# Patient Record
Sex: FEMALE | Race: OTHER | Employment: UNEMPLOYED | URBAN - METROPOLITAN AREA
[De-identification: names, ages, dates, MRNs, and addresses within clinical notes are randomized per-mention and may not be internally consistent; named-entity substitution may affect disease eponyms.]

---

## 2017-05-27 ENCOUNTER — HOSPITAL ENCOUNTER (EMERGENCY)
Facility: HOSPITAL | Age: 52
Discharge: HOME | End: 2017-05-27
Attending: EMERGENCY MEDICINE
Payer: COMMERCIAL

## 2017-05-27 ENCOUNTER — APPOINTMENT (OUTPATIENT)
Dept: GENERAL RADIOLOGY | Facility: HOSPITAL | Age: 52
End: 2017-05-27
Attending: EMERGENCY MEDICINE
Payer: COMMERCIAL

## 2017-05-27 VITALS
SYSTOLIC BLOOD PRESSURE: 116 MMHG | HEIGHT: 63 IN | HEART RATE: 83 BPM | OXYGEN SATURATION: 95 % | TEMPERATURE: 98 F | WEIGHT: 150 LBS | DIASTOLIC BLOOD PRESSURE: 84 MMHG | RESPIRATION RATE: 16 BRPM | BODY MASS INDEX: 26.58 KG/M2

## 2017-05-27 DIAGNOSIS — S52.125A CLOSED NONDISPLACED FRACTURE OF HEAD OF LEFT RADIUS, INITIAL ENCOUNTER: Primary | ICD-10-CM

## 2017-05-27 PROCEDURE — 73080 X-RAY EXAM OF ELBOW: CPT | Performed by: EMERGENCY MEDICINE

## 2017-05-27 PROCEDURE — 99283 EMERGENCY DEPT VISIT LOW MDM: CPT

## 2017-05-27 NOTE — ED INITIAL ASSESSMENT (HPI)
Pt states she fell yesterday on wet surface, fell onto her left arm. Denies hitting head or LOC. Limited ROM to LUE d/t pain. No visible deformity noted.

## 2017-05-27 NOTE — ED NOTES
Pt to ed36 from home for c/o left elbow pain s/p slipping yesterday and landing on it. CMS intact. Pt aox4 w/ full rom, speaking in complete sentences.

## 2017-05-29 NOTE — ED PROVIDER NOTES
Patient Seen in: [facility] AND [facility] Emergency Department    History   Patient presents with:  Upper Extremity Injury (musculoskeletal)    Stated Complaint: L elbow pain    HPI    44-year-old female presents for evaluation of left elbow pain.   Patient had motion. Neck supple. Cardiovascular: Normal rate.     Radial pulses 2+ bilaterally   Pulmonary/Chest: Effort normal.   Musculoskeletal:   Pain with flexion of left elbow, tender to palpation at left olecranon, no edema or ecchymosis   Neurological: She is

## 2017-06-09 ENCOUNTER — HOSPITAL ENCOUNTER (OUTPATIENT)
Dept: GENERAL RADIOLOGY | Facility: HOSPITAL | Age: 52
Discharge: HOME | End: 2017-06-09
Attending: INTERNAL MEDICINE
Payer: COMMERCIAL

## 2017-06-09 ENCOUNTER — LAB ENCOUNTER (OUTPATIENT)
Dept: LAB | Facility: HOSPITAL | Age: 52
End: 2017-06-09
Attending: Other
Payer: COMMERCIAL

## 2017-06-09 DIAGNOSIS — R53.1 WEAKNESS: ICD-10-CM

## 2017-06-09 DIAGNOSIS — M25.532 LEFT WRIST PAIN: ICD-10-CM

## 2017-06-09 DIAGNOSIS — R27.0 ATAXIA: Primary | ICD-10-CM

## 2017-06-09 PROCEDURE — 82550 ASSAY OF CK (CPK): CPT

## 2017-06-09 PROCEDURE — 73100 X-RAY EXAM OF WRIST: CPT | Performed by: INTERNAL MEDICINE

## 2017-06-09 PROCEDURE — 82607 VITAMIN B-12: CPT

## 2017-06-09 PROCEDURE — 86431 RHEUMATOID FACTOR QUANT: CPT

## 2017-06-09 PROCEDURE — 84443 ASSAY THYROID STIM HORMONE: CPT

## 2017-06-09 PROCEDURE — 85652 RBC SED RATE AUTOMATED: CPT

## 2017-06-09 PROCEDURE — 36415 COLL VENOUS BLD VENIPUNCTURE: CPT

## 2017-06-09 PROCEDURE — 86038 ANTINUCLEAR ANTIBODIES: CPT

## 2022-08-26 ENCOUNTER — APPOINTMENT (OUTPATIENT)
Dept: GENERAL RADIOLOGY | Facility: HOSPITAL | Age: 57
End: 2022-08-26
Attending: NURSE PRACTITIONER
Payer: COMMERCIAL

## 2022-08-26 ENCOUNTER — HOSPITAL ENCOUNTER (EMERGENCY)
Facility: HOSPITAL | Age: 57
Discharge: HOME | End: 2022-08-26
Payer: COMMERCIAL

## 2022-08-26 VITALS
HEART RATE: 79 BPM | SYSTOLIC BLOOD PRESSURE: 150 MMHG | RESPIRATION RATE: 18 BRPM | OXYGEN SATURATION: 98 % | DIASTOLIC BLOOD PRESSURE: 88 MMHG | TEMPERATURE: 99 F

## 2022-08-26 DIAGNOSIS — R53.83 FATIGUE, UNSPECIFIED TYPE: Primary | ICD-10-CM

## 2022-08-26 LAB
AMPHET UR QL SCN: NEGATIVE
ANION GAP SERPL CALC-SCNC: 5 MMOL/L (ref 0–18)
BARBITURATES UR QL SCN: NEGATIVE
BASOPHILS # BLD AUTO: 0.05 X10(3) UL (ref 0–0.2)
BASOPHILS NFR BLD AUTO: 0.7 %
BENZODIAZ UR QL SCN: NEGATIVE
BILIRUB UR QL: NEGATIVE
BUN BLD-MCNC: 11 MG/DL (ref 7–18)
BUN/CREAT SERPL: 16.2 (ref 10–20)
CALCIUM BLD-MCNC: 8.9 MG/DL (ref 8.5–10.1)
CANNABINOIDS UR QL SCN: NEGATIVE
CHLORIDE SERPL-SCNC: 112 MMOL/L (ref 98–112)
CLARITY UR: CLEAR
CO2 SERPL-SCNC: 26 MMOL/L (ref 21–32)
COCAINE UR QL: NEGATIVE
COLOR UR: YELLOW
CREAT BLD-MCNC: 0.68 MG/DL
CREAT UR-SCNC: 146 MG/DL
DEPRECATED RDW RBC AUTO: 45 FL (ref 35.1–46.3)
EOSINOPHIL # BLD AUTO: 0.21 X10(3) UL (ref 0–0.7)
EOSINOPHIL NFR BLD AUTO: 3 %
ERYTHROCYTE [DISTWIDTH] IN BLOOD BY AUTOMATED COUNT: 13.2 % (ref 11–15)
GFR SERPLBLD BASED ON 1.73 SQ M-ARVRAT: 103 ML/MIN/1.73M2 (ref 60–?)
GLUCOSE BLD-MCNC: 95 MG/DL (ref 70–99)
GLUCOSE UR-MCNC: NEGATIVE MG/DL
HCT VFR BLD AUTO: 41.7 %
HGB BLD-MCNC: 13.3 G/DL
IMM GRANULOCYTES # BLD AUTO: 0.02 X10(3) UL (ref 0–1)
IMM GRANULOCYTES NFR BLD: 0.3 %
KETONES UR-MCNC: NEGATIVE MG/DL
LEUKOCYTE ESTERASE UR QL STRIP.AUTO: NEGATIVE
LYMPHOCYTES # BLD AUTO: 2.76 X10(3) UL (ref 1–4)
LYMPHOCYTES NFR BLD AUTO: 38.9 %
MCH RBC QN AUTO: 29.6 PG (ref 26–34)
MCHC RBC AUTO-ENTMCNC: 31.9 G/DL (ref 31–37)
MCV RBC AUTO: 92.7 FL
MDMA UR QL SCN: NEGATIVE
METHADONE UR QL SCN: NEGATIVE
MONOCYTES # BLD AUTO: 0.48 X10(3) UL (ref 0.1–1)
MONOCYTES NFR BLD AUTO: 6.8 %
NEUTROPHILS # BLD AUTO: 3.57 X10 (3) UL (ref 1.5–7.7)
NEUTROPHILS # BLD AUTO: 3.57 X10(3) UL (ref 1.5–7.7)
NEUTROPHILS NFR BLD AUTO: 50.3 %
NITRITE UR QL STRIP.AUTO: NEGATIVE
OPIATES UR QL SCN: NEGATIVE
OSMOLALITY SERPL CALC.SUM OF ELEC: 295 MOSM/KG (ref 275–295)
OXYCODONE UR QL SCN: NEGATIVE
PCP UR QL SCN: NEGATIVE
PH UR: 5 [PH] (ref 5–8)
PLATELET # BLD AUTO: 322 10(3)UL (ref 150–450)
POTASSIUM SERPL-SCNC: 3.7 MMOL/L (ref 3.5–5.1)
PROT UR-MCNC: NEGATIVE MG/DL
RBC # BLD AUTO: 4.5 X10(6)UL
SODIUM SERPL-SCNC: 143 MMOL/L (ref 136–145)
SP GR UR STRIP: >=1.03 (ref 1–1.03)
UROBILINOGEN UR STRIP-ACNC: 0.2
WBC # BLD AUTO: 7.1 X10(3) UL (ref 4–11)

## 2022-08-26 PROCEDURE — 71045 X-RAY EXAM CHEST 1 VIEW: CPT | Performed by: NURSE PRACTITIONER

## 2022-08-26 PROCEDURE — 99284 EMERGENCY DEPT VISIT MOD MDM: CPT

## 2022-08-26 PROCEDURE — 85025 COMPLETE CBC W/AUTO DIFF WBC: CPT | Performed by: NURSE PRACTITIONER

## 2022-08-26 PROCEDURE — 80307 DRUG TEST PRSMV CHEM ANLYZR: CPT | Performed by: NURSE PRACTITIONER

## 2022-08-26 PROCEDURE — 80048 BASIC METABOLIC PNL TOTAL CA: CPT | Performed by: NURSE PRACTITIONER

## 2022-08-26 PROCEDURE — 93005 ELECTROCARDIOGRAM TRACING: CPT

## 2022-08-26 PROCEDURE — 93010 ELECTROCARDIOGRAM REPORT: CPT | Performed by: NURSE PRACTITIONER

## 2022-08-26 PROCEDURE — 81015 MICROSCOPIC EXAM OF URINE: CPT | Performed by: NURSE PRACTITIONER

## 2022-08-26 PROCEDURE — 36415 COLL VENOUS BLD VENIPUNCTURE: CPT

## 2022-08-26 PROCEDURE — 81001 URINALYSIS AUTO W/SCOPE: CPT | Performed by: NURSE PRACTITIONER

## 2022-08-27 NOTE — ED QUICK NOTES
Pt left without discharge papers or repeat discharge vital signs. No IV established to be removed at time of discharge. [id number] Charissa Schmitz for patient to discharge home by self per APRN.

## 2022-08-27 NOTE — ED INITIAL ASSESSMENT (HPI)
Patient presents with:  Fatigue: Aox4. Complaints of feeling fatigued and confused last night, then resolved. Reporting feeling lethargic today. [place] negative. Pt states 'they were working on my house and maybe im inhaling all the dust, im not sure. I also found 2 dead rats so i dont know if im being exposed to something'.

## 2024-08-29 ENCOUNTER — OFFICE VISIT (OUTPATIENT)
Dept: OBGYN CLINIC | Facility: CLINIC | Age: 59
End: 2024-08-29

## 2024-08-29 VITALS
HEART RATE: 69 BPM | DIASTOLIC BLOOD PRESSURE: 87 MMHG | WEIGHT: 184.81 LBS | BODY MASS INDEX: 33 KG/M2 | SYSTOLIC BLOOD PRESSURE: 126 MMHG

## 2024-08-29 DIAGNOSIS — Z12.31 VISIT FOR SCREENING MAMMOGRAM: ICD-10-CM

## 2024-08-29 DIAGNOSIS — Z12.4 SCREENING FOR MALIGNANT NEOPLASM OF CERVIX: ICD-10-CM

## 2024-08-29 DIAGNOSIS — Z01.419 ENCOUNTER FOR GYNECOLOGICAL EXAMINATION WITHOUT ABNORMAL FINDING: Primary | ICD-10-CM

## 2024-08-29 PROCEDURE — 99386 PREV VISIT NEW AGE 40-64: CPT | Performed by: OBSTETRICS & GYNECOLOGY

## 2024-08-29 PROCEDURE — 3079F DIAST BP 80-89 MM HG: CPT | Performed by: OBSTETRICS & GYNECOLOGY

## 2024-08-29 PROCEDURE — 3074F SYST BP LT 130 MM HG: CPT | Performed by: OBSTETRICS & GYNECOLOGY

## 2024-08-29 NOTE — PROGRESS NOTES
Tommy Onofre is a 59 year old female  Patient's last menstrual period was 2017.   Chief Complaint   Patient presents with    Gyn Exam     Annual, recently going through a divorce -- has never had mammogram. Last pap years ago. Has not seen PCP. Wishes for HRT. Has never had screening colonoscopy. Hx anxiety/ depr but thinks due to recent divorce. Declines any meds   Wishes for HRT due to feeling fatigued      OBSTETRICS HISTORY:     OB History    Para Term  AB Living   4 1 1 0 3 1   SAB IAB Ectopic Multiple Live Births   0 0 0 0 1      # Outcome Date GA Lbr Robin/2nd Weight Sex Type Anes PTL Lv   4 Term 01   7 lb 8 oz (3.402 kg) M Caesarean   SARWAT   3 AB            2 AB            1 AB                GYNE HISTORY:     Periods none due to menopause         No data to display                  MEDICAL HISTORY:     No past medical history on file.  Past Surgical History:   Procedure Laterality Date           OB History    Para Term  AB Living   4 1 1 0 3 1   SAB IAB Ectopic Multiple Live Births   0 0 0 0 1        SOCIAL HISTORY:     Social History     Socioeconomic History    Marital status:      Spouse name: Not on file    Number of children: Not on file    Years of education: Not on file    Highest education level: Not on file   Occupational History    Not on file   Tobacco Use    Smoking status: Never    Smokeless tobacco: Never   Substance and Sexual Activity    Alcohol use: Yes     Comment: hardly    Drug use: No    Sexual activity: Yes     Partners: Male   Other Topics Concern     Service Not Asked    Blood Transfusions Not Asked    Caffeine Concern Yes     Comment: coffee, tea, 2 cups daily    Occupational Exposure Not Asked    Hobby Hazards Not Asked    Sleep Concern Not Asked    Stress Concern Not Asked    Weight Concern Not Asked    Special Diet Not Asked    Back Care Not Asked    Exercise Not Asked    Bike Helmet Not Asked    Seat  Belt Not Asked    Self-Exams Not Asked   Social History Narrative    Not on file     Social Determinants of Health     Financial Resource Strain: Not on file   Food Insecurity: Not on file   Transportation Needs: Not on file   Physical Activity: Not on file   Stress: Not on file   Social Connections: Not on file   Housing Stability: Not on file       FAMILY HISTORY:     Family History   Problem Relation Age of Onset    Pancreatic Cancer Father     Cancer Father     Cancer Mother         nonhodgkins lymphoma    Melanoma Other         malignant       MEDICATIONS:     No current outpatient medications on file.    ALLERGIES:     No Known Allergies      REVIEW OF SYSTEMS:     Constitutional:    denies fever / chills  Eyes:     denies blurred or double vision  Cardiovascular:  denies chest pain or palpitations  Respiratory:    denies shortness of breath  Gastrointestinal:  denies severe abdominal pain, frequent diarrhea or constipation, nausea / vomiting  Genitourinary:    denies dysuria, bothersome incontinence  Skin/Breast:   denies any breast pain, lumps, or discharge  Neurological:    denies frequent severe headaches  Psychiatric:   denies depression or anxiety, thoughts of harming self or others  Heme/Lymph:    denies easy bruising or bleeding    PHYSICAL EXAM:   Blood pressure 126/87, pulse 69, weight 184 lb 12.8 oz (83.8 kg), last menstrual period 05/07/2017, not currently breastfeeding.  Constitutional:  well developed, well nourished  Head/Face:  normocephalic  Neck/Thyroid:  thyroid symmetric, no thyromegaly, no nodules, no adenopathy  Lymphatic: no abnormal supraclavicular or axillary adenopathy is noted  Breast:   normal without palpable masses, tenderness, asymmetry, nipple discharge, nipple retraction or skin changes  Respiratory:   nonlabored breathing  Cardiovascular: regular rate and rhythm  Abdomen:   soft, nontender, nondistended, no masses  Skin/Hair: no unusual rashes or bruises  Extremities:  no  edema, no cyanosis  Psychiatric:   Oriented to time, place, person and situation. Appropriate mood and affect    Pelvic Exam:  External Genitalia:  normal appearance, hair distribution, and no lesions  Urethral Meatus:   normal in size, location, without lesions and prolapse  Bladder:    no fullness, masses or tenderness  Vagina:    normal appearance without lesions, no abnormal discharge  Cervix:    normal without tenderness on motion  Uterus:    normal in size, contour, position, mobility, without tenderness  Adnexa:   normal without masses or tenderness  Perineum:   normal  Anus:    no hemorroids    ASSESSMENT & PLAN:     Tommy was seen today for gyn exam.    Diagnoses and all orders for this visit:    Encounter for gynecological examination without abnormal finding    Visit for screening mammogram  -     Mammogram Screen 3D Digital Bilateral; Future      Cannot talk about HRT without having general wellness exam by PCP & labs done / mammogram results.  Behavioral health information given    SUMMARY:    Pap: Next cotest today per ASCCP guidelines.    Mammogram: ordered placed    BCM: Postmenopausal    STD screening: declines    Colon cancer screening: guidelines reviewed    Misc: Calcium needs reviewed (1500 mg diet + supplement). Weight bearing exercise encouraged. Call if any VB (if perimenopausal, reviewed abn VB patterns)     updated    Depression screen:   Depression Screening (PHQ-2/PHQ-9): Over the LAST 2 WEEKS   Little interest or pleasure in doing things (over the last two weeks)?: Several days  Little interest or pleasure in doing things: Several days    Feeling down, depressed, or hopeless (over the last two weeks)?: More than half the days  Feeling down, depressed, or hopeless: More than half the days    PHQ-2 SCORE: 3  PHQ-2 SCORE: 3   1. Little interest or pleasure in doing things: Several days  2. Feeling down, depressed, or hopeless: More than half the days  3. Trouble falling or staying  asleep, or sleeping too much: Nearly every day  4. Feeling tired or having little energy: Nearly every day  5. Poor appetite or overeating: Not at all  6. Feeling bad about yourself - or that you are a failure or have let yourself or your family down: Nearly every day  7. Trouble concentrating on things, such as reading the newspaper or watching television: Nearly every day  8. Moving or speaking so slowly that other people could have noticed. Or the opposite - being so fidgety or restless that you have been moving around a lot more than usual: Not at all  9. Thoughts that you would be better off dead, or of hurting yourself in some way: Not at all  PHQ-9 TOTAL SCORE: 15  If you checked off any problems, how difficult have these problems made it for you to do your work, take care of things at home, or get along with other people?: Somewhat difficult        FOLLOW-UP     Return in about 1 year (around 8/29/2025) for annual gyne exam.    Note to patient and family:  The 21st Century Cures Act makes medical notes available to patients in the interest of transparency.  However, please be advised that this is a medical document.  It is intended as a peer to peer communication.  It is written in medical language and may contain abbreviations or verbiage that are technical and unfamiliar.  It may appear blunt or direct.  Medical documents are intended to carry relevant information, facts as evident, and the clinical opinion of the practitioner.

## 2024-08-30 LAB — HPV E6+E7 MRNA CVX QL NAA+PROBE: NEGATIVE

## 2024-09-05 ENCOUNTER — HOSPITAL ENCOUNTER (OUTPATIENT)
Dept: MAMMOGRAPHY | Facility: HOSPITAL | Age: 59
Discharge: HOME | End: 2024-09-05
Attending: OBSTETRICS & GYNECOLOGY
Payer: COMMERCIAL

## 2024-09-05 DIAGNOSIS — Z12.31 VISIT FOR SCREENING MAMMOGRAM: ICD-10-CM

## 2024-09-05 PROCEDURE — 77063 BREAST TOMOSYNTHESIS BI: CPT | Performed by: OBSTETRICS & GYNECOLOGY

## 2024-09-05 PROCEDURE — 77067 SCR MAMMO BI INCL CAD: CPT | Performed by: OBSTETRICS & GYNECOLOGY

## 2024-11-20 ENCOUNTER — LAB ENCOUNTER (OUTPATIENT)
Dept: LAB | Age: 59
End: 2024-11-20
Attending: INTERNAL MEDICINE
Payer: COMMERCIAL

## 2024-11-20 ENCOUNTER — OFFICE VISIT (OUTPATIENT)
Dept: INTERNAL MEDICINE CLINIC | Facility: CLINIC | Age: 59
End: 2024-11-20
Payer: COMMERCIAL

## 2024-11-20 VITALS
TEMPERATURE: 98 F | BODY MASS INDEX: 32.97 KG/M2 | HEIGHT: 63.5 IN | DIASTOLIC BLOOD PRESSURE: 80 MMHG | OXYGEN SATURATION: 100 % | HEART RATE: 68 BPM | SYSTOLIC BLOOD PRESSURE: 150 MMHG | WEIGHT: 188.38 LBS

## 2024-11-20 DIAGNOSIS — Z12.11 SCREEN FOR COLON CANCER: ICD-10-CM

## 2024-11-20 DIAGNOSIS — E66.09 CLASS 1 OBESITY DUE TO EXCESS CALORIES WITH SERIOUS COMORBIDITY AND BODY MASS INDEX (BMI) OF 32.0 TO 32.9 IN ADULT: ICD-10-CM

## 2024-11-20 DIAGNOSIS — E66.811 CLASS 1 OBESITY DUE TO EXCESS CALORIES WITH SERIOUS COMORBIDITY AND BODY MASS INDEX (BMI) OF 32.0 TO 32.9 IN ADULT: ICD-10-CM

## 2024-11-20 DIAGNOSIS — F33.0 MDD (MAJOR DEPRESSIVE DISORDER), RECURRENT EPISODE, MILD (HCC): ICD-10-CM

## 2024-11-20 DIAGNOSIS — Z00.00 LABORATORY EXAM ORDERED AS PART OF ROUTINE GENERAL MEDICAL EXAMINATION: ICD-10-CM

## 2024-11-20 DIAGNOSIS — R03.0 ELEVATED BLOOD PRESSURE READING: ICD-10-CM

## 2024-11-20 DIAGNOSIS — H91.93 BILATERAL HEARING LOSS, UNSPECIFIED HEARING LOSS TYPE: ICD-10-CM

## 2024-11-20 DIAGNOSIS — F41.1 GAD (GENERALIZED ANXIETY DISORDER): ICD-10-CM

## 2024-11-20 DIAGNOSIS — Z00.00 ROUTINE GENERAL MEDICAL EXAMINATION AT A HEALTH CARE FACILITY: Primary | ICD-10-CM

## 2024-11-20 DIAGNOSIS — H93.13 TINNITUS OF BOTH EARS: ICD-10-CM

## 2024-11-20 LAB
ALT SERPL-CCNC: 39 U/L
ANION GAP SERPL CALC-SCNC: 9 MMOL/L (ref 0–18)
AST SERPL-CCNC: 27 U/L (ref ?–34)
BASOPHILS # BLD AUTO: 0.04 X10(3) UL (ref 0–0.2)
BASOPHILS NFR BLD AUTO: 0.6 %
BUN BLD-MCNC: 15 MG/DL (ref 9–23)
CALCIUM BLD-MCNC: 10.1 MG/DL (ref 8.7–10.4)
CHLORIDE SERPL-SCNC: 109 MMOL/L (ref 98–112)
CHOLEST SERPL-MCNC: 237 MG/DL (ref ?–200)
CO2 SERPL-SCNC: 24 MMOL/L (ref 21–32)
CREAT BLD-MCNC: 0.74 MG/DL
EGFRCR SERPLBLD CKD-EPI 2021: 94 ML/MIN/1.73M2 (ref 60–?)
EOSINOPHIL # BLD AUTO: 0.21 X10(3) UL (ref 0–0.7)
EOSINOPHIL NFR BLD AUTO: 3.1 %
ERYTHROCYTE [DISTWIDTH] IN BLOOD BY AUTOMATED COUNT: 12.6 %
FASTING PATIENT LIPID ANSWER: YES
FASTING STATUS PATIENT QL REPORTED: YES
GLUCOSE BLD-MCNC: 102 MG/DL (ref 70–99)
HCT VFR BLD AUTO: 42.4 %
HCV AB SERPL QL IA: NONREACTIVE
HDLC SERPL-MCNC: 69 MG/DL (ref 40–59)
HGB BLD-MCNC: 14.3 G/DL
IMM GRANULOCYTES # BLD AUTO: 0.01 X10(3) UL (ref 0–1)
IMM GRANULOCYTES NFR BLD: 0.1 %
LDLC SERPL CALC-MCNC: 149 MG/DL (ref ?–100)
LYMPHOCYTES # BLD AUTO: 3.22 X10(3) UL (ref 1–4)
LYMPHOCYTES NFR BLD AUTO: 47.6 %
MCH RBC QN AUTO: 30 PG (ref 26–34)
MCHC RBC AUTO-ENTMCNC: 33.7 G/DL (ref 31–37)
MCV RBC AUTO: 89.1 FL
MONOCYTES # BLD AUTO: 0.48 X10(3) UL (ref 0.1–1)
MONOCYTES NFR BLD AUTO: 7.1 %
NEUTROPHILS # BLD AUTO: 2.8 X10 (3) UL (ref 1.5–7.7)
NEUTROPHILS # BLD AUTO: 2.8 X10(3) UL (ref 1.5–7.7)
NEUTROPHILS NFR BLD AUTO: 41.5 %
NONHDLC SERPL-MCNC: 168 MG/DL (ref ?–130)
OSMOLALITY SERPL CALC.SUM OF ELEC: 295 MOSM/KG (ref 275–295)
PLATELET # BLD AUTO: 346 10(3)UL (ref 150–450)
POTASSIUM SERPL-SCNC: 4 MMOL/L (ref 3.5–5.1)
RBC # BLD AUTO: 4.76 X10(6)UL
SODIUM SERPL-SCNC: 142 MMOL/L (ref 136–145)
T4 FREE SERPL-MCNC: 1.2 NG/DL (ref 0.8–1.7)
TRIGL SERPL-MCNC: 107 MG/DL (ref 30–149)
TSI SER-ACNC: 1.98 UIU/ML (ref 0.55–4.78)
VLDLC SERPL CALC-MCNC: 20 MG/DL (ref 0–30)
WBC # BLD AUTO: 6.8 X10(3) UL (ref 4–11)

## 2024-11-20 PROCEDURE — 80048 BASIC METABOLIC PNL TOTAL CA: CPT

## 2024-11-20 PROCEDURE — 99203 OFFICE O/P NEW LOW 30 MIN: CPT | Performed by: INTERNAL MEDICINE

## 2024-11-20 PROCEDURE — 3077F SYST BP >= 140 MM HG: CPT | Performed by: INTERNAL MEDICINE

## 2024-11-20 PROCEDURE — 3008F BODY MASS INDEX DOCD: CPT | Performed by: INTERNAL MEDICINE

## 2024-11-20 PROCEDURE — 86803 HEPATITIS C AB TEST: CPT

## 2024-11-20 PROCEDURE — 36415 COLL VENOUS BLD VENIPUNCTURE: CPT

## 2024-11-20 PROCEDURE — 84439 ASSAY OF FREE THYROXINE: CPT

## 2024-11-20 PROCEDURE — 84450 TRANSFERASE (AST) (SGOT): CPT

## 2024-11-20 PROCEDURE — 80061 LIPID PANEL: CPT

## 2024-11-20 PROCEDURE — 99386 PREV VISIT NEW AGE 40-64: CPT | Performed by: INTERNAL MEDICINE

## 2024-11-20 PROCEDURE — 84460 ALANINE AMINO (ALT) (SGPT): CPT

## 2024-11-20 PROCEDURE — 84443 ASSAY THYROID STIM HORMONE: CPT

## 2024-11-20 PROCEDURE — 85025 COMPLETE CBC W/AUTO DIFF WBC: CPT

## 2024-11-20 PROCEDURE — 3079F DIAST BP 80-89 MM HG: CPT | Performed by: INTERNAL MEDICINE

## 2024-11-20 RX ORDER — MELOXICAM 15 MG/1
1 TABLET ORAL DAILY PRN
COMMUNITY
Start: 2022-05-27 | End: 2024-11-20

## 2024-11-20 RX ORDER — PROGESTERONE 100 MG/1
1 CAPSULE ORAL 2 TIMES DAILY
COMMUNITY

## 2024-11-20 RX ORDER — ESTRADIOL 0.1 MG/D
1 FILM, EXTENDED RELEASE TRANSDERMAL
COMMUNITY
Start: 2022-09-08

## 2024-11-20 NOTE — PROGRESS NOTES
Tommy Onofre is a 59 year old female.     HPI:   Patient presents for a  multiple issues and physical exam. She would like to get labs done. She is a new patient.   She had leg fracture in past after twisting her foot while walking. She had to wear a boot. GM had osteoporosis.  Mood - she feels anxious , mainly social anxiety. She completed a divorce recently. Divorce process started 2 years ago. Her son is supportive. Otherwise no other family members are near. She is trying to work on building more friendships. 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 She is not currently employed. Anxiety is interrupting her sleep. Her appetite is good but she does not eat healthy. She is eating out a lot. She is feeling hopeless, sad, tearful and fatigue. She is trying to exercise. She is walking almost 1 hour every day.   Hearing issues - notices issues when she is social. But I notice she is having issues even hearing me. This started this year. Notices it from both ears but worse on right. She experiences ringing in both ears.   Hormone replacement therapy - she was on estradiol patch for vaginal dryness and painful intercourse. She also felt her mood was better on estrogen and energy levels. She stopped estradiol 3 years ago because she did not want to remain on it long-term.     REVIEW OF SYSTEMS:   GENERAL HEALTH: feels well otherwise. No f/c  NEURO: denies any  LOC, falls. Occ headaches, LH and dizziness.   VISION: denies any blurred or double vision  RESPIRATORY: denies shortness of breath, cough, or congestion  CARDIOVASCULAR: denies chest pain, pressure or palpitations  GI: denies abdominal pain, constipation, diarrhea, n/v, BRBPR, melena  : no dysuria or hematuria. No menses.   SKIN: denies any unusual skin lesions or rashes  PSYCH: mood is as above. Denies SI/HI  EXT: denies edema     Wt Readings from Last 6 Encounters:   11/20/24 188 lb 6.4 oz (85.5 kg)   08/29/24 184 lb 12.8 oz (83.8 kg)   05/27/17 150 lb (68 kg)    16 163 lb 6.4 oz (74.1 kg)   16 159 lb 3.2 oz (72.2 kg)   11/17/15 161 lb (73 kg)       Allergies[1]    Family History   Problem Relation Age of Onset    Pancreatic Cancer Father     Cancer Father     Cancer Mother         nonhodgkins lymphoma    Melanoma Other         malignant      No past medical history on file.   Past Surgical History:   Procedure Laterality Date            Social History:    Social History     Socioeconomic History    Marital status:    Tobacco Use    Smoking status: Never    Smokeless tobacco: Never   Substance and Sexual Activity    Alcohol use: Yes     Comment: hardly    Drug use: No    Sexual activity: Yes     Partners: Male   Other Topics Concern    Caffeine Concern Yes     Comment: coffee, tea, 2 cups daily           EXAM:   /88 (BP Location: Left arm, Patient Position: Sitting, Cuff Size: adult)   Pulse 68   Temp 98 °F (36.7 °C) (Temporal)   Ht 5' 3.5\" (1.613 m)   Wt 188 lb 6.4 oz (85.5 kg)   LMP 2017   SpO2 100%   BMI 32.85 kg/m²   GENERAL: A&O well developed, well nourished,in no apparent distress  SKIN: no rashes,no suspicious lesions  HEENT: atraumatic, MMM, throat is clear, bilateral cerumen is blocking her TM  NECK: supple, no jvd, no thyromegaly  LUNGS: clear to auscultation bilateraly, no c/w/r  CARDIO: RRR without g/m/r  GI: soft non tender nondistended no hsm bs throughout  NEURO: CN 2-12 grossly intact  PSYCH: pleasant  EXTREMITIES: no cyanosis, clubbing or edema    ASSESSMENT AND PLAN:   # Elevated BP: she states she has never had HTN. She will start checking pressures at home.   # Obese, BMI 32: she is working on lifestyle changes.   # MDD and anxiety: her mood is not well controlled but she declines counseling or medications. She feels she is managing with stress is good. Wellbutrin helped a bit but caused \"terrible weight loss.\", prozac did not help. She felt much better on HRT and wants to resume it but we will have  to make sure her labs are okay, her ASCVD score is low, and her blood pressures are controlled prior to starting.   # tinnitus and hearing loss , bilateral cerumen - referred to ENT for further evaluation.   # Health Maintenance: wellness exam in 11/2024  Indication for ASA (50-58 yo): calculate after she completes lipids  Colon Cancer Screening: no Fhx. Discussed and she declines scope but willing to do FIT   Cervical Cancer Screening: neg cytology and HPV in 9/2024. Repeat in 5 years (2029)  Breast Cancer Screening: has dense breasts, cont yearly mammogram. Performed shared clinical decision-making and she declines supplemental imaging.   Bone Health/Fall Prevention ([place] Chi): educated on dietary calcium/vit D3, weight bearing exercises and fall prevention  Vaccines: counseled on flu, covid, shingrix, and tdap.   Hep C screening: Ab ordered  Medical POA: her ex- would be her primary contact. He is an internal medicine physician      The patient indicates understanding of these issues and agrees to the plan.  The patient is asked to return in 3 months for HTN and HRT.   Nory Preciado MD             [1] No Known Allergies

## 2024-11-20 NOTE — PATIENT INSTRUCTIONS
I recommend the following vaccines -  TDAP (tetanus, diptheriae, pertussis) vaccine every 10 years.     Shingrix vaccine once in a lifetime. It is a two step vaccine given 2-6 months apart.     Annual FLU every September, October.    Stay up to date with COVID vaccines.     Please bring your machine into your next office visit to ensure it is accurate. I like the [employer] brand. Please keep the receipt.   Please measure your blood pressure and pulse daily and record these values. Please measure your blood pressure once daily after you have been resting for at least 5 minutes. Both feet should be flat on the ground and you should be seated with your back supported. Please communicate your blood pressures to me in 14 days.

## 2024-11-22 ENCOUNTER — LAB ENCOUNTER (OUTPATIENT)
Dept: LAB | Age: 59
End: 2024-11-22
Attending: INTERNAL MEDICINE
Payer: COMMERCIAL

## 2024-11-22 DIAGNOSIS — Z12.11 SCREEN FOR COLON CANCER: ICD-10-CM

## 2024-11-22 LAB — HEMOCCULT STL QL: NEGATIVE

## 2024-11-22 PROCEDURE — 82274 ASSAY TEST FOR BLOOD FECAL: CPT

## 2024-11-29 ENCOUNTER — HOSPITAL ENCOUNTER (OUTPATIENT)
Age: 59
Discharge: HOME | End: 2024-11-29
Payer: COMMERCIAL

## 2024-11-29 ENCOUNTER — APPOINTMENT (OUTPATIENT)
Dept: GENERAL RADIOLOGY | Age: 59
End: 2024-11-29
Attending: PHYSICIAN ASSISTANT
Payer: COMMERCIAL

## 2024-11-29 VITALS
WEIGHT: 182 LBS | HEIGHT: 63.5 IN | OXYGEN SATURATION: 98 % | TEMPERATURE: 98 F | DIASTOLIC BLOOD PRESSURE: 85 MMHG | RESPIRATION RATE: 20 BRPM | SYSTOLIC BLOOD PRESSURE: 142 MMHG | BODY MASS INDEX: 31.85 KG/M2 | HEART RATE: 86 BPM

## 2024-11-29 DIAGNOSIS — S62.629A CLOSED AVULSION FRACTURE OF MIDDLE PHALANX OF FINGER, INITIAL ENCOUNTER: Primary | ICD-10-CM

## 2024-11-29 PROCEDURE — 99213 OFFICE O/P EST LOW 20 MIN: CPT

## 2024-11-29 PROCEDURE — 73140 X-RAY EXAM OF FINGER(S): CPT | Performed by: PHYSICIAN ASSISTANT

## 2024-11-29 PROCEDURE — 26720 TREAT FINGER FRACTURE EACH: CPT

## 2024-11-29 NOTE — ED PROVIDER NOTES
Patient Seen in: [facility]      History     Chief Complaint   Patient presents with    Finger Injury     Stated Complaint: Finger Injury    Subjective:   HPI    58 YO female present to immediate care for evaluation of left third and fourth finger pain after trip and fall on uneven floor in home.  She denies hitting her head, LOC, headache, chest pain or SOB.  She reports pain and swelling to the left fourth finger, primarily at the PIP joint.  Pain to the distal left fourth finger.        Objective:     History reviewed. No pertinent past medical history.           Past Surgical History:   Procedure Laterality Date                      Social History     Socioeconomic History    Marital status:    Tobacco Use    Smoking status: Never    Smokeless tobacco: Never   Substance and Sexual Activity    Alcohol use: Not Currently    Drug use: No    Sexual activity: Yes     Partners: Male   Other Topics Concern    Caffeine Concern Yes     Comment: coffee, tea, 2 cups daily              Review of Systems    Positive for stated complaint: Finger Injury  Other systems are as noted in HPI.  Constitutional and vital signs reviewed.      All other systems reviewed and negative except as noted above.    Physical Exam     ED Triage Vitals [24 1640]   /85   Pulse 86   Resp 20   Temp 97.8 °F (36.6 °C)   Temp src Temporal   SpO2 98 %   O2 Device None (Room air)       Current Vitals:   Vital Signs  BP: 142/85  Pulse: 86  Resp: 20  Temp: 97.8 °F (36.6 °C)  Temp src: Temporal    Oxygen Therapy  SpO2: 98 %  O2 Device: None (Room air)        Physical Exam  Vitals and nursing note reviewed.   Constitutional:       General: She is not in acute distress.     Appearance: Normal appearance. She is not ill-appearing, toxic-appearing or diaphoretic.   Cardiovascular:      Rate and Rhythm: Normal rate.   Pulmonary:      Effort: Pulmonary effort is normal. No respiratory distress.    Musculoskeletal:      Left hand: Swelling (diffuse swelling to left 3rd finger) and tenderness (Tenderness to distal phalanx of left ring finger and PIP joint of left 3rd finger) present.   Skin:     Findings: Bruising (diffuse bruising to left 3rd finger and scant bruising to distal phalanx of left 4th finger) present.   Neurological:      Mental Status: She is alert and oriented to person, place, and time.   Psychiatric:         Behavior: Behavior normal.         ED Course   Labs Reviewed - No data to display  XR FINGER(S) (MIN 2 VIEWS), LEFT 3RD (CPT=73140)    Result Date: 11/29/2024  PROCEDURE:  XR FINGER(S) (MIN 2 VIEWS), LEFT 3RD (CPT=73140)  INDICATIONS:  Finger Injury  COMPARISON:  [facility], XR, XR FINGER(S) (MIN 2 VIEWS), LEFT 4TH (CPT=73140), 11/29/2024, 5:02 PM.  TECHNIQUE:  Three views of the finger were obtained.  PATIENT STATED HISTORY: (As transcribed by Technologist)  Patient states that she fell and injured her left third and fourth digits. Patient states that she has left third digit pain and swelling. Patient has limited range of motion and states that the pain is located along the left third proximal interphalangeal joint.    FINDINGS:  There is a dorsal plate avulsion fracture of the middle phalanx.  Fracture fragment 0.2 cm.  Separation 0.3 cm.  Mild underlying arthritis.             CONCLUSION:  Middle phalanx dorsal plate avulsion fracture.   LOCATION:  [facility]   Dictated by (CST): Abhishek Keenan MD on 11/29/2024 at 5:24 PM     Finalized by (CST): Abhishek Keenan MD on 11/29/2024 at 5:25 PM       XR FINGER(S) (MIN 2 VIEWS), LEFT 4TH (CPT=73140)    Result Date: 11/29/2024  PROCEDURE:  XR FINGER(S) (MIN 2 VIEWS), LEFT 4TH (CPT=73140)  INDICATIONS:  Finger Injury  COMPARISON:  [facility], XR, XR FINGER(S) (MIN 2 VIEWS), LEFT 3RD (CPT=73140), 11/29/2024, 5:01 PM.  TECHNIQUE:  Three views of the finger were obtained.  PATIENT STATED HISTORY: (As transcribed by Technologist)  Patient states that she  fell and injured her left third and fourth digits. Patient states that she has left third digit pain and swelling. Patient has limited range of motion and states that the pain is located along the left third proximal interphalangeal joint.    FINDINGS:  There is mild arthritis.  No acute fracture dislocation of the 4th digit.  The 3rd finger is partially imaged.  There is a dorsal plate avulsion fracture of the middle phalanx.  Fracture fragment 0.2 cm. Separation 0.3 cm.             CONCLUSION:  Please see as above.   LOCATION:  [facility]   Dictated by (CST): Abhishek Keenan MD on 11/29/2024 at 5:23 PM     Finalized by (CST): Abhishek Keenan MD on 11/29/2024 at 5:24 PM        I independently reviewed x-rays.  Avulsion fracture to middle phalanx.     MDM      Differential diagnosis considered but not limited to contusion, sprain, fracture    Physical exam as above. X-ray left 3rd and 4th finger  FINDINGS:  There is mild arthritis.  No acute fracture dislocation of the 4th digit.There is a dorsal plate avulsion fracture of the middle phalanx. Fracture fragment 0.2 cm. Separation 0.3 cm.     Finger splint applied to 3rd finger. Patient to continue follow-up with Hand. OTC Ibuprofen as needed for pain.       Medical Decision Making  Amount and/or Complexity of Data Reviewed  Radiology: ordered and independent interpretation performed. Decision-making details documented in ED Course.    Risk  OTC drugs.        Disposition and Plan     Clinical Impression:  1. Closed avulsion fracture of middle phalanx of finger, initial encounter         Disposition:  Discharge  11/29/2024  5:43 pm    Follow-up:  Laure Juárez PA  [address] [address]  [phone]    Schedule an appointment as soon as possible for a visit             Medications Prescribed:  Discharge Medication List as of 11/29/2024  5:43 PM              Supplementary Documentation:

## 2024-11-29 NOTE — ED INITIAL ASSESSMENT (HPI)
Presents for injury to the left middle finger. Fell forward and twisted. Is now swollen and painful cannot move completely.

## 2024-12-02 ENCOUNTER — TELEPHONE (OUTPATIENT)
Facility: CLINIC | Age: 59
End: 2024-12-02

## 2024-12-02 NOTE — TELEPHONE ENCOUNTER
Please see imaging in epic and advise if/when patient can be seen and if you would like repeat imaging in or out of splint

## 2024-12-02 NOTE — TELEPHONE ENCOUNTER
Patient was seen in  on 11/29/2024 for left middle finger fx.    Imaging in Epic.    Please advise when patient can be seen.

## 2024-12-02 NOTE — TELEPHONE ENCOUNTER
Scheduled   Future Appointments   Date Time Provider Department Center   12/4/2024 11:30 AM Travis Tadeo MD [facility]

## 2024-12-04 ENCOUNTER — HOSPITAL ENCOUNTER (OUTPATIENT)
Dept: GENERAL RADIOLOGY | Age: 59
Discharge: HOME | End: 2024-12-04
Attending: ORTHOPAEDIC SURGERY
Payer: COMMERCIAL

## 2024-12-04 ENCOUNTER — OFFICE VISIT (OUTPATIENT)
Dept: ORTHOPEDICS CLINIC | Facility: CLINIC | Age: 59
End: 2024-12-04
Payer: COMMERCIAL

## 2024-12-04 ENCOUNTER — TELEPHONE (OUTPATIENT)
Dept: ORTHOPEDICS CLINIC | Facility: CLINIC | Age: 59
End: 2024-12-04

## 2024-12-04 VITALS — WEIGHT: 182 LBS | BODY MASS INDEX: 31.85 KG/M2 | HEIGHT: 63.5 IN

## 2024-12-04 DIAGNOSIS — S62.623A CLOSED DISPLACED FRACTURE OF MIDDLE PHALANX OF LEFT MIDDLE FINGER, INITIAL ENCOUNTER: Primary | ICD-10-CM

## 2024-12-04 DIAGNOSIS — S62.623A CLOSED DISPLACED FRACTURE OF MIDDLE PHALANX OF LEFT MIDDLE FINGER, INITIAL ENCOUNTER: ICD-10-CM

## 2024-12-04 PROCEDURE — 3008F BODY MASS INDEX DOCD: CPT | Performed by: ORTHOPAEDIC SURGERY

## 2024-12-04 PROCEDURE — 73140 X-RAY EXAM OF FINGER(S): CPT | Performed by: ORTHOPAEDIC SURGERY

## 2024-12-04 PROCEDURE — 99205 OFFICE O/P NEW HI 60 MIN: CPT | Performed by: ORTHOPAEDIC SURGERY

## 2024-12-04 NOTE — H&P
Clinic Note     Assessment/Plan:  59 year old female    Left middle finger central slip avulsion fracture-there is an extensor lag is noted on examination.  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  The patient  elected to proceed with surgery.  Plan is for repair of central slip tendon to base of middle phalanx versus open reduction internal fixation of the avulsion fracture depending on size of the fragment.  Fracture Patient consented to surgery after expected outcomes, time required for recovery, need for therapy, and risks associated with surgery which include but not limited to: infection, nerve injury, CRPS, tendon injury/rupture, vascular injury, stiffness, loss of reduction, implant failure, symptomatic hardware, nonunion, persistent deformity/extensor lag.  Surgery scheduled for 12/10/2024    Follow Up: Sx date    Diagnostic Studies:     XR Left middle finger 3 views: Displaced avulsion fracture with the PIP joint in extension       Physical Exam:     Ht 5' 3.5\" (1.613 m)   Wt 182 lb (82.6 kg)   LMP 05/07/2017   BMI 31.73 kg/m²     Constitutional: NAD. AOx3. Well-developed and Well-nourished.   Psychiatric: Normal mood/ affect/ behavior. Judgment and thought content normal.     Left Upper Extremity:     Inspection    Skin intact. No skin lesions. No obvious mass visualized.  Swelling and subtle bruising around the PIP joint noted.  Finger rests in a slightly flexed position at the PIP joint   Palpation    Tenderness to palpation of the PIP joint      ROM    Extensor lag of 15 to 20 degrees is noted.  Subtle hyperextension at the DIP joint was noted on examination     Neurovascular    Normal sensation in the median, ulnar, and radial nerve distribution. Normal motor  function of muscles innervated by median/AIN, ulnar, and radial/PIN nerves.    Normally perfused hand(s).     Special    None          CC: Left middle finger injury    HPI: This 59 year old RHD female presents with injury to the left middle finger.  Occurred Friday, 2024 after a fall.  Patient reports moderate aching sharp type pain.w patient was seen initially in the immediate care where she was splinted after x-rays confirmed fracture.    Occupation: Housewife    History/Other:   No past medical history on file.  Past Surgical History:   Procedure Laterality Date           Current Outpatient Medications   Medication Sig Dispense Refill    estradiol (LYLLANA) 0.1 MG/24HR Transdermal Patch Biweekly Place 1 patch onto the skin twice a week. (Patient not taking: Reported on 2024)      progesterone 100 MG Oral Cap Take 1 capsule (100 mg total) by mouth 2 (two) times daily. (Patient not taking: Reported on 2024)       Allergies[1]  Family History   Problem Relation Age of Onset    Pancreatic Cancer Father     Cancer Mother         nonhodgkins lymphoma    Melanoma Other         malignant     Social History     Occupational History    Not on file   Tobacco Use    Smoking status: Never    Smokeless tobacco: Never   Substance and Sexual Activity    Alcohol use: Not Currently    Drug use: No    Sexual activity: Yes     Partners: Male          Review of Systems (negative unless bolded):  General: fevers, chills, fatigue  CV:  chest pain, palpitations, leg swelling  Msk: bodyaches, neck pain, neck stiffness  Skin: rashes, open wounds, nonhealing ulcers  Hem: bleeds easily, bruise easily, immunocompromised  Neuro: dizziness, light headedness, headaches  Psych: anxious, depressed, anger issues      Travis Tadeo MD   Hand, Wrist, & Elbow Surgery  [email or web address]  t: [phone]  f: [phone]         [1] No Known Allergies

## 2024-12-04 NOTE — TELEPHONE ENCOUNTER
Date of Surgery: 12/10/2024    Post Op Appt: 2024 1040AM    Case ID: [id number]    Notes:     SURGICAL BOOKING SHEET   Name: Tommy Onofre  MRN: [id number]   : 1965     Surgical Date:    12/10/24   Surgical Consent:    Repair of left middle finger central slip avulsion fracture   Diagnosis:         ICD-10-CM   1. Closed displaced fracture of middle phalanx of left middle finger, initial encounter  S62.623A       Workers Comp: No   Procedure Codes:    ORIF 96052   Disposition:    Outpatient   Operative Time:    45 mins   Antibiotics:    Ancef 2g   Anesthesia Type:    Monitored Anesthesia Care (MAC)   Clearance:     None   Equipment:    Local Premix, [employer]ati [employer] juggernaut anchor 1.0 mm with 3-0 suture x 2, 4-0 nylon, Irrisept, bacitracin, Adaptic, AlumaFoam splint , 1 inch coban,    Assistant:    Carlyle Assistant: Yes, Laure Torres PA-C   Follow Up:    7-14 days post op with Laure   Pain Medication:    Tramadol 50 mg   Therapy:    TBD

## 2024-12-04 NOTE — PROGRESS NOTES
SURGICAL BOOKING SHEET   Name: Tommy Onofre  MRN: [id number]   : 1965    Surgical Date:   12/10/24   Surgical Consent:   Repair of left middle finger central slip avulsion fracture   Diagnosis:      ICD-10-CM   1. Closed displaced fracture of middle phalanx of left middle finger, initial encounter  S62.623A       Workers Comp: No   Procedure Codes:   ORIF 72569   Disposition:   Outpatient   Operative Time:   45 mins   Antibiotics:   Ancef 2g   Anesthesia Type:   Monitored Anesthesia Care (MAC)   Clearance:    None   Equipment:   Local Premix, Tati [employer] juggernaut anchor 1.0 mm with 3-0 suture x 2, 4-0 nylon, Irrisept, bacitracin, Adaptic, AlumaFoam splint , 1 inch coban,    Assistant:   Carlyle Assistant: Yes, Laure Torres PA-C   Follow Up:   7-14 days post op with Laure   Pain Medication:   Tramadol 50 mg   Therapy:   TBD

## 2024-12-05 NOTE — TELEPHONE ENCOUNTER
S/P went over surgical protocol and scheduled Post Op Appointment. Patient had no other questions or concerns. I did advise the patient if they have any additional questions to give us a call back for further assistance.

## 2024-12-11 DIAGNOSIS — S62.623A CLOSED DISPLACED FRACTURE OF MIDDLE PHALANX OF LEFT MIDDLE FINGER, INITIAL ENCOUNTER: Primary | ICD-10-CM

## 2024-12-16 ENCOUNTER — TELEPHONE (OUTPATIENT)
Dept: PHYSICAL THERAPY | Facility: HOSPITAL | Age: 59
End: 2024-12-16

## 2024-12-16 NOTE — PROGRESS NOTES
OCCUPATIONAL THERAPY UPPER EXTREMITY EVALUATION   Referring Provider: / Franck  Diagnosis:   Closed displaced fracture of middle phalanx of left middle finger;  central slip surgery and lateral band repair/involvement     Orders:   Order Date:  12/11/2024  Authorizing Provider:   DAR MACKENZIE     Procedure:  OP REFERRAL TO [facility] [[id number]]         Order #:   [id number]  Qty:  1     Priority:  Critical                   Class:   IHP - RFL     Standing Interval:          Standing Occurrences:          Expires on:            Expected by:    Associated DX:  Closed displaced fracture of middle phalanx of left middle finger, initial encounter (S62.623A)     Order summary:  IHP - RFL, Critical, 8 visits  Occupational  Therapy Area of Concentration: Orthopedic  Occupational Therapy Ortho: UE  If the patient can be seen sooner with a PT vs an OT, can we cancel this order and replace with a PT order? No         Comments:  Postsurgical: please schedule in 5-7 days  Patient had central slip surgery and lateral band repair/involvement   Zone 3 navneet womens protocol MAYI protocol   2-3x/week for 6 weeks         SUBJECTIVE:  Tommy Onofre is a 59 year old female who presents to therapy today with complaints of left hand injury.  Pt describes pain level current 4/10, at best 4/10, at worst 4/10.   Current functional limitations include bathing, dressing, cooking.    Tommy describes prior level of function independent/no limitations.  Employment:  homemaker  Hand Dominance: right  Living Situation:  college age son  HPI: DOI 11/29/24, s/p fall,trip and fall on uneven floor in home  DOS: 12/10/24:  Procedures:      Open reduction internal fixation of right middle finger intra-articular fracture of the middle phalanx (central slip avulsion fracture) (CPT 29168)  Imaging/Tests: xrays  Pt goals include full use of hand.  Past medical history was reviewed with Tommy. Significant findings:    Past Medical History:    Anxiety state    Depression    High blood pressure    no meds           Precautions:  per protocol    OBJECTIVE:    OBSERVATION: Unremarkable       OUTCOME MEASURE   On Initial Evaluation:  QuickDASH Outcome Score  Score: (Patient-Rptd) 56.82 % (12/17/2024 12:26 PM)      ORTHOTICS: Fabricated custom volar HFO with MP at 30 degrees flexion and IPJ's in full extension.  Fabricated volar custom FO: PIP allowed to 30 degrees flexion and DIPJ to 25 degrees, as well as exercise orthosis for DIP flexion with PIP in extension.  Taught HEP within orthoses.    SCAR:  TBA      SENSORY: WNL          Date:  12/17/24   Edema Girth measurements (cm)  Right/Left  left   Digit: MF  P1: 7.3  PIP: 7.7  P2: 6.8  DIP: 6.0  P3: 5.4   AROM to be formally measured: Able to achieve PIPJ flexion to ~20 degrees and DIP to ~10 degrees; full extension apparent, but with edema, PIPJ looks less.            Occupational profile: history and experiences, patterns of daily living, interests, values and needs:    Patient's expressed concerns about performing occupations and daily life on initial eval:     Occupational roles affected by referring diagnosis on initial eval:   Student: N/a     Homemaker:  limited   Parent: limited   Worker: N/a     Cook/: limited   /transporter: limited       ASSESSMENT  Tommy presents to occupational therapy evaluation with primary c/o left hand stiffness, swelling and pain. The results of the objective tests and measures show the physical, cognitive and/or psychosocial skills affected in the summary below, including specifically of note decreased ROM and mild edema in the LMF.  Functional deficits include but are not limited to gripping objects, food prep and dressing/bathing/grooming.  Signs and symptoms are consistent with diagnosis of MF fx/central slip and lateral band injury, s/p repair/ORIF. Patient and OT discussed evaluation findings, pathology, POC and HEP.   Patient voiced understanding and performs HEP correctly without reported pain. Skilled Occupational Therapy is medically necessary to address the above impairments and reach functional goals.     OT Complexity Rationale:  Based on analysis of data from a problem-focused assessment from a brief chart review, clinical presentation of physical, cognitive and psychosocial skills, as well as review of patient rated outcome measures, this evaluation involved Low complexity decision making, with 1-3 occupational performance component deficits, no comorbidities, and no need for modification of tasks or assistance with assessment.      These deficits impair the patient's ability to participate in ADLs, IADLs, and meaningful occupational tasks.  Reduced participation in meaningful occupational tasks may increase feelings of sadness and isolation, and likelihood of falling.      Today’s Treatment and Response:   Treatment provided today in addition to the initial evaluation:           Date 12/17/24       Visit # 1       # of visits authorized: HMO 8 through 12/31/24     # of visits in OT POC:  16     Evaluation Initial X     Progress Report written        Manual Therapy: Minutes:         MEM         retrograde massage         IASTM      STM       scar massage         Ther ex: Minutes:  10 (charged with orthotic fitting and training per CPT definition)        AROM to PP and DIP within confines of orthosis:  With wrist placed in 30 flexion and Mps slightly flexed x       AROM DIP flexion within confines of orthosis with PIP held in full ext; wrist and Mps flexed as above x                                                                        HEP/  Patient education topics See HEP notes below       Therapeutic Activity: Minutes:                                                  Self care training       Objective measurements taken and reviewed with patient (See above detail)   X        Neuromuscular Re-education: Minutes:                                    Orthotic fitting and training Fabricated custom orthoses per protocol and PA direction     Taping      Modalities      X= performed this date as previously outlined    HEP  On initial eval, patient education was provided on exam findings, treatment diagnosis, treatment plan, expectations, and prognosis. Patient was also provided recommendations for skin care, elevation    HEP/Education topic (See also above table) Date Issued Date modified Date discharged Comments    AROM in orthoses per protocol 12/17/24                                      Goals: (to be met in 16 visits)  Patient will be independent and compliant with comprehensive HEP to maintain progress achieved in OT.  Patient will present with increase in left digit 2-5 HINDS to 210 to allow for ease with gripping toothbrush.  Patient will demonstrate a decrease in edema in the left hand/fingers by 0.4cm circumference to allow for ease with 0composite  around steering wheel.  Patient will report no more than 1/10 pain in left hand during self care activities.  Patient will present with  strength in the left hand to that of 50% of the contralateral hand in order to be able to perform housework.  Patient will present with sufficient ROM and strength in the left hand/arm to return to work, self care, homemaking and leisure skill independent performance.  Patient will demonstrate an improvement in the self rated DASH score to 25% or better to reflect functional gains reported, in the achievement of opening a jar, carrying a bag and in daily skills.  Additional goals may be established as clinically indicated.    PLAN:  Frequency / Duration: Patient will be seen for 1-2 x/week or a total of 16 visits over a 90 day period.  Treatment will include: Manual Therapy, Neuromuscular Re-education, Self-Care Home Management, Therapeutic Activities, Therapeutic Exercise, Home Exercise Program instruction, Modalities to include: Ultrasound and  hot packs , FT, and paraffin,  taping, and custom splinting.    Next session: per protocol, monitor wrist position and self performance, consider 1xweek, monitor protocol    Education or treatment limitation: None  Rehab Potential:good    Patient was advised of these findings, precautions, and treatment options and has agreed to actively participate in planning and for this course of care.    Charges: OT Eval: Low Complexity, 2 orthotic fitting and training  Total Timed Treatment: 30 minutes     Total Treatment Time: 45 minutes      Ynes Alexander OTR/L S T  Physician's certification required: Yes  I certify the need for these services furnished under this plan of treatment and while under my care. (electronic signature)    X___________________________________________________ Date____________________    Certification From: 12/16/2024  To:3/16/2025

## 2024-12-17 ENCOUNTER — OFFICE VISIT (OUTPATIENT)
Dept: OCCUPATIONAL MEDICINE | Age: 59
End: 2024-12-17
Attending: PHYSICIAN ASSISTANT
Payer: COMMERCIAL

## 2024-12-17 ENCOUNTER — TELEPHONE (OUTPATIENT)
Dept: ORTHOPEDICS CLINIC | Facility: CLINIC | Age: 59
End: 2024-12-17

## 2024-12-17 DIAGNOSIS — S62.623A CLOSED DISPLACED FRACTURE OF MIDDLE PHALANX OF LEFT MIDDLE FINGER, INITIAL ENCOUNTER: Primary | ICD-10-CM

## 2024-12-17 PROCEDURE — 97165 OT EVAL LOW COMPLEX 30 MIN: CPT

## 2024-12-17 PROCEDURE — 97760 ORTHOTIC MGMT&TRAING 1ST ENC: CPT

## 2024-12-17 NOTE — PROGRESS NOTES
Occupational Therapy Daily Note    Diagnosis:      Closed displaced fracture of middle phalanx of left middle finger;  central slip surgery and lateral band repair/involvement    Referring Provider: Franck  Date of Evaluation:    12/17/24    Precautions:   per protocol    anxiety Next Physician/PA visit: 1/6/24  DOI: 11/29/24  Date of Surgery: 12/10/24  Procedures:      Open reduction internal fixation of right middle finger intra-articular fracture of the middle phalanx (central slip avulsion fracture) (CPT 90882)   lateral band repair/involvement    Insurance Primary/Secondary: [employer] [place] HMO / N/A     # Auth Visits: 8 through 12/31/24            Diagnosis:       Orders:   Order Date:  12/11/2024  Authorizing Provider:   DAR MACKENZIE     Procedure:  OP REFERRAL TO [facility] [[id number]]         Order #:   [id number]  Qty:  1     Priority:  Critical                   Class:   IHP - RFL     Standing Interval:          Standing Occurrences:          Expires on:            Expected by:    Associated DX:  Closed displaced fracture of middle phalanx of left middle finger, initial encounter (S62.623A)     Order summary:  IHP - RFL, Critical, 8 visits  Occupational  Therapy Area of Concentration: Orthopedic  Occupational Therapy Ortho: UE  If the patient can be seen sooner with a PT vs an OT, can we cancel this order and replace with a PT order? No         Comments:  Postsurgical: please schedule in 5-7 days  Patient had central slip surgery and lateral band repair/involvement   Zone 3 navneet womens protocol MAYI protocol   2-3x/week for 6 weeks         SUBJECTIVE:  \"Yesterday I did the exercises 3-4 times.\"  Reports much pain and not taking any pain medicine.    Pain: 5-6/10    OBJECTIVE:        OUTCOME MEASURE   On Initial Evaluation:  QuickDASH Outcome Score  Score: (Patient-Rptd) 56.82 % (12/17/2024 12:26 PM)          Date:  12/17/24   Edema Girth measurements (cm)  Right/Left  left   Digit: MF  P1:  7.3  PIP: 7.7  P2: 6.8  DIP: 6.0  P3: 5.4   AROM to be formally measured: Able to achieve PIPJ flexion to ~20 degrees and DIP to ~10 degrees; full extension apparent, but with edema, PIPJ looks less.          ASSESSMENT  Tolerating treatment and movement more today. C/o shoulder stiffness likely due to co-contraction in entire upper limb and trunk in protective mode/posture.          Date 12/17/24 12/19/24     Visit # 1  2     # of visits authorized: HMO 8 through 12/31/24 8    # of visits in OT POC:  16 16    Evaluation Initial X     Progress Report written        Manual Therapy: Minutes:    15     MEM   x      retrograde massage    x     IASTM  Tuning fork, finger scraper    STM  x     scar massage    touching with tuning fork     Ther ex: Minutes:  10 (charged with orthotic fitting and training per CPT definition)  23      AROM to PP and DIP within confines of orthosis:  With wrist placed in 30 flexion and Mps slightly flexed x  x     AROM DIP flexion within confines of orthosis with PIP held in full ext; wrist and Mps flexed as above x  x      targeted finger AROM within limits of protocol   x                                                            HEP/  Patient education topics See HEP notes below  reviewed wrist flexed position during HEP     Therapeutic Activity: Minutes:                                                  Self care training       Objective measurements taken and reviewed with patient (See above detail)   X        Neuromuscular Re-education: Minutes:                                   Orthotic fitting and training Fabricated custom orthoses per protocol and PA direction     Taping      Modalities  Hot pack 5 minutes    X= performed this date as previously outlined    HEP  On initial eval, patient education was provided on exam findings, treatment diagnosis, treatment plan, expectations, and prognosis. Patient was also provided recommendations for skin care, elevation    HEP/Education topic  (See also above table) Date Issued Date modified Date discharged Comments    AROM in orthoses per protocol 12/17/24                                      Goals: (to be met in 16 visits)  Patient will be independent and compliant with comprehensive HEP to maintain progress achieved in OT. (Progressing)  Patient will present with increase in left digit 2-5 HINDS to 210 to allow for ease with gripping toothbrush. (Progressing)  Patient will demonstrate a decrease in edema in the left hand/fingers by 0.4cm circumference to allow for ease with 0composite  around steering wheel. (Progressing)  Patient will report no more than 1/10 pain in left hand during self care activities. (Progressing)  Patient will present with  strength in the left hand to that of 50% of the contralateral hand in order to be able to perform housework. (Progressing)  Patient will present with sufficient ROM and strength in the left hand/arm to return to work, self care, homemaking and leisure skill independent performance. (Progressing)  Patient will demonstrate an improvement in the self rated DASH score to 25% or better to reflect functional gains reported, in the achievement of opening a jar, carrying a bag and in daily skills. (Progressing)  Additional goals may be established as clinically indicated.    PLAN:  Frequency / Duration: Patient will be seen for 1-2 x/week or a total of 16 visits over a 90 day period.  Treatment will include: Manual Therapy, Neuromuscular Re-education, Self-Care Home Management, Therapeutic Activities, Therapeutic Exercise, Home Exercise Program instruction, Modalities to include: Ultrasound and hot packs , FT, and paraffin,  taping, and custom splinting.    Next session: per protocol, monitor wrist position and self performance, consider 1xweek, monitor protocol    Charges:   1 MT, 2 TE   Total Timed Treatment: 38 minutes    Total Treatment Time: 43 minutes  PAUL Williamson, OTR/L, CHT

## 2024-12-18 ENCOUNTER — OFFICE VISIT (OUTPATIENT)
Dept: ORTHOPEDICS CLINIC | Facility: CLINIC | Age: 59
End: 2024-12-18
Payer: COMMERCIAL

## 2024-12-18 ENCOUNTER — TELEPHONE (OUTPATIENT)
Dept: PHYSICAL THERAPY | Age: 59
End: 2024-12-18

## 2024-12-18 ENCOUNTER — HOSPITAL ENCOUNTER (OUTPATIENT)
Dept: GENERAL RADIOLOGY | Age: 59
Discharge: HOME | End: 2024-12-18
Attending: PHYSICIAN ASSISTANT
Payer: COMMERCIAL

## 2024-12-18 VITALS — WEIGHT: 182 LBS | HEIGHT: 63.5 IN | BODY MASS INDEX: 31.85 KG/M2

## 2024-12-18 DIAGNOSIS — S62.623A CLOSED DISPLACED FRACTURE OF MIDDLE PHALANX OF LEFT MIDDLE FINGER, INITIAL ENCOUNTER: ICD-10-CM

## 2024-12-18 DIAGNOSIS — S62.623A CLOSED DISPLACED FRACTURE OF MIDDLE PHALANX OF LEFT MIDDLE FINGER, INITIAL ENCOUNTER: Primary | ICD-10-CM

## 2024-12-18 PROCEDURE — 73140 X-RAY EXAM OF FINGER(S): CPT | Performed by: PHYSICIAN ASSISTANT

## 2024-12-18 PROCEDURE — 3008F BODY MASS INDEX DOCD: CPT | Performed by: PHYSICIAN ASSISTANT

## 2024-12-18 PROCEDURE — 99024 POSTOP FOLLOW-UP VISIT: CPT | Performed by: PHYSICIAN ASSISTANT

## 2024-12-18 NOTE — PROGRESS NOTES
Clinic Note     Assessment/Plan:  59 year old female    Left middle finger central slip repair/middle phalanx ORIF 12/10/2024-removed her stitches today.  She we will continue in therapy per the protocol as just the importance of working on motion multiple times a day to prevent further stiffness.  All of her questions were answered I would like to see her back in 3 weeks to monitor her recovery    Follow Up: 3 weeks with x-rays before    Diagnostic Studies:     XR Left middle finger 3 views: Evidence of ORIF with retained plate no migration or failure noted.       Physical Exam:     Ht 5' 3.5\" (1.613 m)   Wt 182 lb (82.6 kg)   LMP 2017   BMI 31.73 kg/m²     Constitutional: NAD. AOx3. Well-developed and Well-nourished.   Psychiatric: Normal mood/ affect/ behavior. Judgment and thought content normal.     Left Upper Extremity:     Inspection  Incision healing well with no signs of infection Palpation    Tenderness to palpation at the surgical site and with passive motion      ROM  MP 10/40  PIP 0/10  DIP 0/20       Neurovascular    Normal sensation in the median, ulnar, and radial nerve distribution. Normal motor function of muscles innervated by median/AIN, ulnar, and radial/PIN nerves.    Normally perfused hand(s).     Special    None          CC: Left middle finger injury    HPI: This 59 year old RHD female presents with injury to the left middle finger.  Occurred Friday, 2024 after a fall.  Patient reports moderate aching sharp type pain.w patient was seen initially in the immediate care where she was splinted after x-rays confirmed fracture.    Occupation: Housewife    Interval history: Patient underwent right middle finger middle phalanx ORIF/central slip repair.    History/Other:   Past Medical History:    Anxiety state    Depression    High blood pressure    no meds     Past Surgical History:   Procedure Laterality Date           Current Outpatient Medications   Medication Sig  Dispense Refill    traMADol 50 MG Oral Tab Take 1 tablet (50 mg total) by mouth every 6 (six) hours as needed for Pain. (Patient not taking: Reported on 12/18/2024) 20 tablet 0    estradiol (LYLLANA) 0.1 MG/24HR Transdermal Patch Biweekly Place 1 patch onto the skin twice a week. (Patient not taking: Reported on 12/4/2024)      progesterone 100 MG Oral Cap Take 1 capsule (100 mg total) by mouth 2 (two) times daily. (Patient not taking: Reported on 12/4/2024)       Allergies[1]  Family History   Problem Relation Age of Onset    Pancreatic Cancer Father     Cancer Mother         nonhodgkins lymphoma    Melanoma Other         malignant     Social History     Occupational History    Not on file   Tobacco Use    Smoking status: Never    Smokeless tobacco: Never   Substance and Sexual Activity    Alcohol use: Not Currently    Drug use: No    Sexual activity: Yes     Partners: Male          Review of Systems (negative unless bolded):  General: fevers, chills, fatigue  CV:  chest pain, palpitations, leg swelling  Msk: bodyaches, neck pain, neck stiffness  Skin: rashes, open wounds, nonhealing ulcers  Hem: bleeds easily, bruise easily, immunocompromised  Neuro: dizziness, light headedness, headaches  Psych: anxious, depressed, anger issues  Laure Juárez PA-C  Hand, Wrist, & Elbow Surgery  Physician Assistant to Dr. Travis [email or web address]  t: [phone]  f: [phone]         [1] No Known Allergies

## 2024-12-20 ENCOUNTER — OFFICE VISIT (OUTPATIENT)
Dept: OCCUPATIONAL MEDICINE | Age: 59
End: 2024-12-20
Attending: PHYSICIAN ASSISTANT
Payer: COMMERCIAL

## 2024-12-20 PROCEDURE — 97140 MANUAL THERAPY 1/> REGIONS: CPT

## 2024-12-20 PROCEDURE — 97110 THERAPEUTIC EXERCISES: CPT

## 2024-12-20 NOTE — PROGRESS NOTES
Occupational Therapy Daily Note    Diagnosis:      Closed displaced fracture of middle phalanx of left middle finger;  central slip surgery and lateral band repair/involvement    Referring Provider: Franck  Date of Evaluation:    12/17/24    Precautions:   per protocol    anxiety Next Physician/PA visit: 1/6/24  DOI: 11/29/24  Date of Surgery: 12/10/24  Procedures:      Open reduction internal fixation of right middle finger intra-articular fracture of the middle phalanx (central slip avulsion fracture) (CPT 09202)   lateral band repair/involvement    Insurance Primary/Secondary: [employer] / N/A     # Auth Visits: 8 through 12/31/24            Diagnosis:       Orders:   Order Date:  12/11/2024  Authorizing Provider:   DAR MACKENZIE     Procedure:  OP REFERRAL TO [facility] [[id number]]         Order #:   [id number]  Qty:  1     Priority:  Critical                   Class:   IHP - RFL     Standing Interval:          Standing Occurrences:          Expires on:            Expected by:    Associated DX:  Closed displaced fracture of middle phalanx of left middle finger, initial encounter (S62.623A)     Order summary:  IHP - RFL, Critical, 8 visits  Occupational  Therapy Area of Concentration: Orthopedic  Occupational Therapy Ortho: UE  If the patient can be seen sooner with a PT vs an OT, can we cancel this order and replace with a PT order? No         Comments:  Postsurgical: please schedule in 5-7 days  Patient had central slip surgery and lateral band repair/involvement   Zone 3 navneet womens protocol [facility] protocol   2-3x/week for 6 weeks         SUBJECTIVE:  Reports not wearing orthosis last night while sleeping.  C/o skin irritation on scar incision skin.  C/o splint not providing support for finger, reports she does not like the splint.  Is not wanting to comply with it.  \"I can use my hand without it.\"  \"The splint makes is swollen.\"  Reports covering incision with band aid for  moisture.    Pain: scar: 4-5/10    OBJECTIVE:        OUTCOME MEASURE   On Initial Evaluation:  QuickDASH Outcome Score  Score: (Patient-Rptd) 56.82 % (12/17/2024 12:26 PM)          Date:  12/17/24 12/26/24   Edema Girth measurements (cm)  Right/Left  left left   Digit: MF  P1: 7.3  PIP: 7.7  P2: 6.8  DIP: 6.0  P3: 5.4 7.8  7.4  6.7  6.1  5.3   AROM to be formally measured: Able to achieve PIPJ flexion to ~20 degrees and DIP to ~10 degrees; full extension apparent, but with edema, PIPJ looks less.          ASSESSMENT  Patient continues with intermittent use of orthosis.  Questionable compliance with program entirely given complaints voiced.  Area of tenderness noted, but no drainage or open area on incision, which was mildly moistened from use of band aid.  Difficult to progress her safely given these concerns.            Date 12/17/24 12/19/24 12/26/24   Visit # 1  2  3   # of visits authorized: HMO 8 through 12/31/24 8 8   # of visits in OT POC:  16 16 16   Evaluation Initial X     Progress Report written        Manual Therapy: Minutes:    15  8   MEM   x  x    retrograde massage    x  x   IASTM  Tuning fork, finger scraper x   STM  x x    scar massage    touching with tuning fork  x   Ther ex: Minutes:  10 (charged with orthotic fitting and training per CPT definition)  23  10    AROM to PP and DIP within confines of orthosis:  With wrist placed in 30 flexion and Mps slightly flexed x  x  x   AROM DIP flexion within confines of orthosis with PIP held in full ext; wrist and Mps flexed as above x  x  x    targeted finger AROM within limits of protocol   x  x                                                          HEP/  Patient education topics See HEP notes below  reviewed wrist flexed position during HEP  avoid band aid, allow air to incision; scar massage, use of orthosis and exercises.   Therapeutic Activity: Minutes:                                                  Self care training       Objective  measurements taken and reviewed with patient (See above detail)   X     x   Neuromuscular Re-education: Minutes:                                   Orthotic fitting and training Fabricated custom orthoses per protocol and PA direction  Adjusted with new straps; encouraged compliance; pt voiced that it felt more secure once finger strap was back in place; added proximal strap on P1 of MF   Taping      Modalities  Hot pack 5 minutes Hot pack 5 minutes   X= performed this date as previously outlined    HEP  On initial eval, patient education was provided on exam findings, treatment diagnosis, treatment plan, expectations, and prognosis. Patient was also provided recommendations for skin care, elevation    HEP/Education topic (See also above table) Date Issued Date modified Date discharged Comments    AROM in orthoses per protocol 12/17/24                                      Goals: (to be met in 16 visits)  Patient will be independent and compliant with comprehensive HEP to maintain progress achieved in OT. (Progressing)  Patient will present with increase in left digit 2-5 HINDS to 210 to allow for ease with gripping toothbrush. (Progressing)  Patient will demonstrate a decrease in edema in the left hand/fingers by 0.4cm circumference to allow for ease with 0composite  around steering wheel. (Progressing)  Patient will report no more than 1/10 pain in left hand during self care activities. (Progressing)  Patient will present with  strength in the left hand to that of 50% of the contralateral hand in order to be able to perform housework. (Progressing)  Patient will present with sufficient ROM and strength in the left hand/arm to return to work, self care, homemaking and leisure skill independent performance. (Progressing)  Patient will demonstrate an improvement in the self rated DASH score to 25% or better to reflect functional gains reported, in the achievement of opening a jar, carrying a bag and in daily  skills. (Progressing)  Additional goals may be established as clinically indicated.    PLAN:  Frequency / Duration: Patient will be seen for 1-2 x/week or a total of 16 visits over a 90 day period.  Treatment will include: Manual Therapy, Neuromuscular Re-education, Self-Care Home Management, Therapeutic Activities, Therapeutic Exercise, Home Exercise Program instruction, Modalities to include: Ultrasound and hot packs , FT, and paraffin,  taping, and custom splinting.    Next session: per protocol, monitor wrist position and self performance, consider 1xweek, monitor protocol compliance    Charges:   1 MT, 1 TE, 1 orthotic fitting and training   Total Timed Treatment: 38 minutes  Patient arrived 30 minutes late for initial appt, able to accommodate, but not for full 45 minutes  Total Treatment Time: 38 minutes  PAUL Williamson, OTR/L, CHT

## 2024-12-26 ENCOUNTER — OFFICE VISIT (OUTPATIENT)
Dept: OCCUPATIONAL MEDICINE | Age: 59
End: 2024-12-26
Attending: PHYSICIAN ASSISTANT
Payer: COMMERCIAL

## 2024-12-26 PROCEDURE — 97110 THERAPEUTIC EXERCISES: CPT

## 2024-12-26 PROCEDURE — 97760 ORTHOTIC MGMT&TRAING 1ST ENC: CPT

## 2024-12-26 PROCEDURE — 97140 MANUAL THERAPY 1/> REGIONS: CPT

## 2024-12-27 NOTE — PROGRESS NOTES
Occupational Therapy Daily Note    Diagnosis:      Closed displaced fracture of middle phalanx of left middle finger;  central slip surgery and lateral band repair/involvement    Referring Provider: Franck  Date of Evaluation:    12/17/24    Precautions:   per protocol    anxiety Next Physician/PA visit: 1/6/24  DOI: 11/29/24  Date of Surgery: 12/10/24  Procedures:      Open reduction internal fixation of right middle finger intra-articular fracture of the middle phalanx (central slip avulsion fracture) (CPT 67118)   lateral band repair/involvement    Insurance Primary/Secondary: [place] / N/A     # Auth Visits: 8 through 12/31/24            Diagnosis:       Orders:   Order Date:  12/11/2024  Authorizing Provider:   DAR MACKENZIE     Procedure:  OP REFERRAL TO [facility] [[id number]]         Order #:   [id number]  Qty:  1     Priority:  Critical                   Class:   IHP - RFL     Standing Interval:          Standing Occurrences:          Expires on:            Expected by:    Associated DX:  Closed displaced fracture of middle phalanx of left middle finger, initial encounter (S62.623A)     Order summary:  IHP - RFL, Critical, 8 visits  Occupational  Therapy Area of Concentration: Orthopedic  Occupational Therapy Ortho: UE  If the patient can be seen sooner with a PT vs an OT, can we cancel this order and replace with a PT order? No         Comments:  Postsurgical: please schedule in 5-7 days  Patient had central slip surgery and lateral band repair/involvement   Zone 3 navneet womens protocol [facility] protocol   2-3x/week for 6 weeks         SUBJECTIVE:  C/o soreness in PIPJ both volarly and dorsally.  Reports compliance with use of orthosis.    Pain: scar: 3-4/10    OBJECTIVE:        OUTCOME MEASURE   On Initial Evaluation:  QuickDASH Outcome Score  Score: (Patient-Rptd) 56.82 % (12/17/2024 12:26 PM)          Date:  12/17/24 12/26/24   Edema Girth measurements (cm)  Right/Left  left left    Digit: MF  P1: 7.3  PIP: 7.7  P2: 6.8  DIP: 6.0  P3: 5.4 7.8  7.4  6.7  6.1  5.3   AROM to be formally measured: Able to achieve PIPJ flexion to ~20 degrees and DIP to ~10 degrees; full extension apparent, but with edema, PIPJ looks less.          ASSESSMENT  Patient presents with improvement in pain and sight improvement in active motion. However, scar tissue does limit both flexion and extension of the MF.        Date  12/19/24 12/26/24 12/31/24   Visit #  2  3 4   # of visits authorized: HMO 8 8 8   # of visits in OT POC:  16 16 16   Evaluation      Progress Report written        Manual Therapy: Minutes:  15  8 10   MEM  x  x x    retrograde massage  x  x x   IASTM Tuning fork, finger scraper x x   STM x x x    scar massage  touching with tuning fork  x x   Ther ex: Minutes:  23  10 15    AROM to PP and DIP within confines of orthosis:  With wrist placed in 30 flexion and Mps slightly flexed  x  x x   AROM DIP flexion within confines of orthosis with PIP held in full ext; wrist and Mps flexed as above  x  x x    targeted finger AROM within limits of protocol x  x x                                                      HEP/  Patient education topics  reviewed wrist flexed position during HEP  avoid band aid, allow air to incision; scar massage, use of orthosis and exercises. Anatomy of injury, progression of therapy   Therapeutic Activity: Minutes:                                                  Self care training       Objective measurements taken and reviewed with patient (See above detail)    x    Neuromuscular Re-education: Minutes:                                Orthotic fitting and training  Adjusted with new straps; encouraged compliance; pt voiced that it felt more secure once finger strap was back in place; added proximal strap on P1 of MF Exercise orthosis for DIP/PIP motion adjusted per protocol.   Taping      Modalities Hot pack 5 minutes Hot pack 5 minutes Hot pack 5 minutes   X= performed this  date as previously outlined    HEP  On initial eval, patient education was provided on exam findings, treatment diagnosis, treatment plan, expectations, and prognosis. Patient was also provided recommendations for skin care, elevation    HEP/Education topic (See also above table) Date Issued Date modified Date discharged Comments    AROM in orthoses per protocol 12/17/24                                      Goals: (to be met in 16 visits)  Patient will be independent and compliant with comprehensive HEP to maintain progress achieved in OT. (Progressing)  Patient will present with increase in left digit 2-5 HINDS to 210 to allow for ease with gripping toothbrush. (Progressing)  Patient will demonstrate a decrease in edema in the left hand/fingers by 0.4cm circumference to allow for ease with 0composite  around steering wheel. (Progressing)  Patient will report no more than 1/10 pain in left hand during self care activities. (Progressing)  Patient will present with  strength in the left hand to that of 50% of the contralateral hand in order to be able to perform housework. (Progressing)  Patient will present with sufficient ROM and strength in the left hand/arm to return to work, self care, homemaking and leisure skill independent performance. (Progressing)  Patient will demonstrate an improvement in the self rated DASH score to 25% or better to reflect functional gains reported, in the achievement of opening a jar, carrying a bag and in daily skills. (Progressing)  Additional goals may be established as clinically indicated.    PLAN:  Frequency / Duration: Patient will be seen for 1-2 x/week or a total of 16 visits over a 90 day period.  Treatment will include: Manual Therapy, Neuromuscular Re-education, Self-Care Home Management, Therapeutic Activities, Therapeutic Exercise, Home Exercise Program instruction, Modalities to include: Ultrasound and hot packs , FT, and paraffin,  taping, and custom  splinting.    Next session: per protocol, monitor wrist position and self performance, progress per protocol to FO    Charges:   1 MT, 1 TE, 1 orthotic fitting and training   Total Timed Treatment: 38 minutes    Total Treatment Time: 43 minutes  PAUL Williamson, OTR/L, CHT

## 2024-12-31 ENCOUNTER — OFFICE VISIT (OUTPATIENT)
Dept: OCCUPATIONAL MEDICINE | Age: 59
End: 2024-12-31
Attending: PHYSICIAN ASSISTANT
Payer: COMMERCIAL

## 2024-12-31 PROCEDURE — 97110 THERAPEUTIC EXERCISES: CPT

## 2024-12-31 PROCEDURE — 97760 ORTHOTIC MGMT&TRAING 1ST ENC: CPT

## 2024-12-31 PROCEDURE — 97140 MANUAL THERAPY 1/> REGIONS: CPT

## 2024-12-31 NOTE — PROGRESS NOTES
Occupational Therapy Daily Note    Diagnosis:      Closed displaced fracture of middle phalanx of left middle finger;  central slip surgery and lateral band repair/involvement    Referring Provider: Franck  Date of Evaluation:    12/17/24    Precautions:   per protocol    anxiety Next Physician/PA visit: 2/3/25  DOI: 11/29/24  Date of Surgery: 12/10/24  Procedures:      Open reduction internal fixation of right middle finger intra-articular fracture of the middle phalanx (central slip avulsion fracture) (CPT 92666)   lateral band repair/involvement    Insurance Primary/Secondary: [employer] / N/A     # Auth Visits: 8 through 12/31/24        Diagnosis:       Orders:   Order Date:  12/11/2024  Authorizing Provider:   DAR MACKENZIE     Procedure:  OP REFERRAL TO [facility] [[id number]]         Order #:   [id number]  Qty:  1     Priority:  Critical                   Class:   IHP - RFL     Standing Interval:          Standing Occurrences:          Expires on:            Expected by:    Associated DX:  Closed displaced fracture of middle phalanx of left middle finger, initial encounter (S62.623A)     Order summary:  IHP - RFL, Critical, 8 visits  Occupational  Therapy Area of Concentration: Orthopedic  Occupational Therapy Ortho: UE  If the patient can be seen sooner with a PT vs an OT, can we cancel this order and replace with a PT order? No         Comments:  Postsurgical: please schedule in 5-7 days  Patient had central slip surgery and lateral band repair/involvement   Zone 3 navneet womens protocol [facility] protocol   2-3x/week for 6 weeks         SUBJECTIVE:  C/o splint not fitting, causing swelling.  Reporting that the scar tissue is tight and unyielding. Reports doing HEP around \"two times a day.\"  C/o weakness in left arm, not using it.    Pain: scar: 4-5/10    OBJECTIVE:        OUTCOME MEASURE   On Initial Evaluation:  QuickDASH Outcome Score  Score: (Patient-Rptd) 56.82 % (12/17/2024 12:26  PM)          Date:  12/17/24 12/26/24   Edema Girth measurements (cm)  Right/Left  left left   Digit: MF  P1: 7.3  PIP: 7.7  P2: 6.8  DIP: 6.0  P3: 5.4 7.8  7.4  6.7  6.1  5.3   AROM to be formally measured: Able to achieve PIPJ flexion to ~20 degrees and DIP to ~10 degrees; full extension apparent, but with edema, PIPJ looks less.        Date: 1/6/25  LEFT  Digital AROM (°)  Index Finger  Middle Finger  Ring Finger  Small Finger    MP Ext/Flex  0/70 0/75 0/75 0/70   PIP Ext/Flex  0/80 10/40 0/75 0/75   DIP Ext/Flex  0/50 0/15 0/50 0/65   HINDS  200 120 200 210         ASSESSMENT  Patient difficult to educate due to disagreeing with therapist, as well as not listening to education and instructions.  This has limited progress and status.  Fear and misunderstanding has limited her adhering to protocol despite multiple attempts to educated and reassure her.  As a result, she has scar adherence and limited ROM as noted above.  Trialed 3PP step down flexion splint, but she did not tolerate its application.  As she did not bring in her orthoses today, unable to modify the static exercise splints into static stretch splints.          Date  12/26/24 12/31/24 1/6/25   Visit #  3 4 5   # of visits authorized: HMO 8 8 8   # of visits in OT POC:  16 16 16   Evaluation      Progress Report written       Manual Therapy: Minutes:  8 10 10   MEM  x x     retrograde massage  x x x   IASTM x x x   STM x x x    scar massage  x x x   Ther ex: Minutes:  10 15 10    AROM to PP and DIP within confines of orthosis:  With wrist placed in 30 flexion and Mps slightly flexed  x x    AROM DIP flexion within confines of orthosis with PIP held in full ext; wrist and Mps flexed as above  x x     targeted finger AROM within limits of protocol  x x     Fluidotherapy    Fluidotherapy for 10 minutes to left hand, with AROM x digits performed.                                           HEP/  Patient education topics  avoid band aid, allow air to incision;  scar massage, use of orthosis and exercises. Anatomy of injury, progression of therapy Importance of compliance with protocol, listening to therapist   Therapeutic Activity: Minutes:                                                 Self care training       Objective measurements taken and reviewed with patient (See above detail)  x  x   Neuromuscular Re-education: Minutes:                               Orthotic fitting and training Adjusted with new straps; encouraged compliance; pt voiced that it felt more secure once finger strap was back in place; added proximal strap on P1 of MF Exercise orthosis for DIP/PIP motion adjusted per protocol. Fabricated per protocol FO with DIP and PIP in extension; reviewed full time use, removal more than 2xday for AROM and PROM.  Tried 3PP step down flexion splint; pt did not tolerate.   Taping      Modalities Hot pack 5 minutes Hot pack 5 minutes    X= performed this date as previously outlined    HEP  On initial eval, patient education was provided on exam findings, treatment diagnosis, treatment plan, expectations, and prognosis. Patient was also provided recommendations for skin care, elevation    HEP/Education topic (See also above table) Date Issued Date modified Date discharged Comments    AROM in orthoses per protocol 12/17/24                                      Goals: (to be met in 16 visits)  Patient will be independent and compliant with comprehensive HEP to maintain progress achieved in OT. (Progressing)  Patient will present with increase in left digit 2-5 HINDS to 210 to allow for ease with gripping toothbrush. (Progressing)  Patient will demonstrate a decrease in edema in the left hand/fingers by 0.4cm circumference to allow for ease with 0composite  around steering wheel. (Progressing)  Patient will report no more than 1/10 pain in left hand during self care activities. (Progressing)  Patient will present with  strength in the left hand to that of 50% of the  contralateral hand in order to be able to perform housework. (Progressing)  Patient will present with sufficient ROM and strength in the left hand/arm to return to work, self care, homemaking and leisure skill independent performance. (Progressing)  Patient will demonstrate an improvement in the self rated DASH score to 25% or better to reflect functional gains reported, in the achievement of opening a jar, carrying a bag and in daily skills. (Progressing)  Additional goals may be established as clinically indicated.    PLAN:  Frequency / Duration: Patient will be seen for 1-2 x/week or a total of 16 visits over a 90 day period.  Treatment will include: Manual Therapy, Neuromuscular Re-education, Self-Care Home Management, Therapeutic Activities, Therapeutic Exercise, Home Exercise Program instruction, Modalities to include: Ultrasound and hot packs , FT, and paraffin,  taping, and custom splinting.    Next session: Fluidotherapy, scar massage, taping once scabs have released; fabricate static flexed positioner from exercise orthoses and P1 blocker exerciser    Charges:   1 MT, 1 TE, 1 orthotic fitting and training   Total Timed Treatment: 40 minutes    Total Treatment Time: 40 minutes  PAUL Williamson, OTR/L, CHT

## 2025-01-03 ENCOUNTER — TELEPHONE (OUTPATIENT)
Dept: ORTHOPEDICS CLINIC | Facility: CLINIC | Age: 60
End: 2025-01-03

## 2025-01-03 ENCOUNTER — TELEPHONE (OUTPATIENT)
Dept: PHYSICAL THERAPY | Age: 60
End: 2025-01-03

## 2025-01-03 DIAGNOSIS — S62.623A CLOSED DISPLACED FRACTURE OF MIDDLE PHALANX OF LEFT MIDDLE FINGER, INITIAL ENCOUNTER: Primary | ICD-10-CM

## 2025-01-06 ENCOUNTER — HOSPITAL ENCOUNTER (OUTPATIENT)
Dept: GENERAL RADIOLOGY | Age: 60
Discharge: HOME | End: 2025-01-06
Attending: PHYSICIAN ASSISTANT
Payer: COMMERCIAL

## 2025-01-06 ENCOUNTER — OFFICE VISIT (OUTPATIENT)
Dept: ORTHOPEDICS CLINIC | Facility: CLINIC | Age: 60
End: 2025-01-06
Payer: COMMERCIAL

## 2025-01-06 ENCOUNTER — OFFICE VISIT (OUTPATIENT)
Dept: OCCUPATIONAL MEDICINE | Age: 60
End: 2025-01-06
Attending: PHYSICIAN ASSISTANT
Payer: COMMERCIAL

## 2025-01-06 VITALS — WEIGHT: 182 LBS | BODY MASS INDEX: 31.85 KG/M2 | HEIGHT: 63.5 IN

## 2025-01-06 DIAGNOSIS — S62.623A CLOSED DISPLACED FRACTURE OF MIDDLE PHALANX OF LEFT MIDDLE FINGER, INITIAL ENCOUNTER: ICD-10-CM

## 2025-01-06 DIAGNOSIS — S62.623A CLOSED DISPLACED FRACTURE OF MIDDLE PHALANX OF LEFT MIDDLE FINGER, INITIAL ENCOUNTER: Primary | ICD-10-CM

## 2025-01-06 PROCEDURE — 97110 THERAPEUTIC EXERCISES: CPT

## 2025-01-06 PROCEDURE — 99024 POSTOP FOLLOW-UP VISIT: CPT | Performed by: PHYSICIAN ASSISTANT

## 2025-01-06 PROCEDURE — 73140 X-RAY EXAM OF FINGER(S): CPT | Performed by: PHYSICIAN ASSISTANT

## 2025-01-06 PROCEDURE — 97760 ORTHOTIC MGMT&TRAING 1ST ENC: CPT

## 2025-01-06 PROCEDURE — 3008F BODY MASS INDEX DOCD: CPT | Performed by: PHYSICIAN ASSISTANT

## 2025-01-06 PROCEDURE — 97140 MANUAL THERAPY 1/> REGIONS: CPT

## 2025-01-06 NOTE — PROGRESS NOTES
Occupational Therapy Daily Note    Diagnosis:      Closed displaced fracture of middle phalanx of left middle finger;  central slip surgery and lateral band repair/involvement    Referring Provider: Franck  Date of Evaluation:    12/17/24    Precautions:   per protocol    anxiety Next Physician/PA visit: 2/3/25  DOI: 11/29/24  Date of Surgery: 12/10/24  Procedures:      Open reduction internal fixation of right middle finger intra-articular fracture of the middle phalanx (central slip avulsion fracture) (CPT 88253)   lateral band repair/involvement    Insurance Primary/Secondary: [place] / N/A     # Auth Visits: 8 through 12/31/24        Diagnosis:       Orders:   Order Date:  12/11/2024  Authorizing Provider:   DAR MACKENZIE     Procedure:  OP REFERRAL TO [facility] [[id number]]         Order #:   [id number]  Qty:  1     Priority:  Critical                   Class:   IHP - RFL     Standing Interval:          Standing Occurrences:          Expires on:            Expected by:    Associated DX:  Closed displaced fracture of middle phalanx of left middle finger, initial encounter (S62.623A)     Order summary:  IHP - RFL, Critical, 8 visits  Occupational  Therapy Area of Concentration: Orthopedic  Occupational Therapy Ortho: UE  If the patient can be seen sooner with a PT vs an OT, can we cancel this order and replace with a PT order? No         Comments:  Postsurgical: please schedule in 5-7 days  Patient had central slip surgery and lateral band repair/involvement   Zone 3 navneet womens protocol [facility] protocol   2-3x/week for 6 weeks         SUBJECTIVE:  \"It feel like it's ripping, stretching, pain.\"    Pain: 3-4/10    OBJECTIVE:        OUTCOME MEASURE   On Initial Evaluation:  QuickDASH Outcome Score  Score: (Patient-Rptd) 56.82 % (12/17/2024 12:26 PM)          Date:  12/17/24 12/26/24 1/8/25   Edema Girth measurements (cm)  Right/Left  left left left   Digit: MF  P1: 7.3  PIP: 7.7  P2:  6.8  DIP: 6.0  P3: 5.4 7.8  7.4  6.7  6.1  5.3 7.5  7.3  6.4  5.6  5.0   AROM to be formally measured: Able to achieve PIPJ flexion to ~20 degrees and DIP to ~10 degrees; full extension apparent, but with edema, PIPJ looks less.        Date: 1/6/25  LEFT  Digital AROM (°)  Index Finger  Middle Finger  Ring Finger  Small Finger    MP Ext/Flex  0/70 0/75 0/75 0/70   PIP Ext/Flex  0/80 10/40 0/75 0/75   DIP Ext/Flex  0/50 0/15 0/50 0/65   HINDS  200 120 200 210         ASSESSMENT  Patient presents with reducing edema in the LMF.  There is still mildly adhered skin and scar tissue on the dorsum of the digit, which limits motion.  Minimal scabs and dead skin, so encouraged again self scar massage.  Able to progress her with protocol without complaint, listening more today to therapist's directions and instructions. Verbalized understanding.      Date 12/31/24 1/6/25 1/8/25   Visit # 4 5 6   # of visits authorized: HMO 8 8 -   # of visits in OT POC:  16 16 16   Evaluation      Progress Report written      Manual Therapy: Minutes: 10 10 10   MEM x      retrograde massage x x x   IASTM x x x   STM x x x    scar massage x x x   Ther ex: Minutes: 15 10 10    AROM to PP and DIP within confines of orthosis:  With wrist placed in 30 flexion and Mps slightly flexed x     AROM DIP flexion within confines of orthosis with PIP held in full ext; wrist and Mps flexed as above x      targeted finger AROM within limits of protocol x      Fluidotherapy  Fluidotherapy for 10 minutes to left hand, with AROM x digits performed.   Fluidotherapy for 10 minutes to left hand, with AROM x digits performed.      Blocking   In P1 blocker                              HEP/  Patient education topics Anatomy of injury, progression of therapy Importance of compliance with protocol, listening to therapist P1 blocker use, static holding in exercise splint at rest, 10-30 minutes while watching TV; issued pink sponge for in=hand manipulation; towel scrunches    Therapeutic Activity: Minutes:   10   Finger wraps      Towel scrunches   x   In-hand manipulation   Vibrating ball, pink sponge                           Self care training       Objective measurements taken and reviewed with patient (See above detail)  x x   Neuromuscular Re-education: Minutes:                              Orthotic fitting and training Exercise orthosis for DIP/PIP motion adjusted per protocol. Fabricated per protocol FO with DIP and PIP in extension; reviewed full time use, removal more than 2xday for AROM and PROM.  Tried 3PP step down flexion splint; pt did not tolerate. Fabricated custom P1 blocker exerciser; modified finger exerciser for more flexion and added straps to progressively statically hold digit for flexion stretch   Taping      Modalities Hot pack 5 minutes     X= performed this date as previously outlined    HEP  On initial eval, patient education was provided on exam findings, treatment diagnosis, treatment plan, expectations, and prognosis. Patient was also provided recommendations for skin care, elevation    HEP/Education topic (See also above table) Date Issued Date modified Date discharged Comments    AROM in orthoses per protocol 12/17/24                                      Goals: (to be met in 16 visits)  Patient will be independent and compliant with comprehensive HEP to maintain progress achieved in OT. (Progressing)  Patient will present with increase in left digit 2-5 HINDS to 210 to allow for ease with gripping toothbrush. (Progressing)  Patient will demonstrate a decrease in edema in the left hand/fingers by 0.4cm circumference to allow for ease with 0composite  around steering wheel. (Progressing)  Patient will report no more than 1/10 pain in left hand during self care activities. (Progressing)  Patient will present with  strength in the left hand to that of 50% of the contralateral hand in order to be able to perform housework. (Progressing)  Patient will  present with sufficient ROM and strength in the left hand/arm to return to work, self care, homemaking and leisure skill independent performance. (Progressing)  Patient will demonstrate an improvement in the self rated DASH score to 25% or better to reflect functional gains reported, in the achievement of opening a jar, carrying a bag and in daily skills. (Progressing)  Additional goals may be established as clinically indicated.    PLAN:  Frequency / Duration: Patient will be seen for 1-2 x/week or a total of 16 visits over a 90 day period.  Treatment will include: Manual Therapy, Neuromuscular Re-education, Self-Care Home Management, Therapeutic Activities, Therapeutic Exercise, Home Exercise Program instruction, Modalities to include: Ultrasound and hot packs , FT, and paraffin,  taping, and custom splinting.    Next session: Fluidotherapy, scar massage, taping once scabs have released; consider yoke orthosis use    Charges:   1 MT, 1 TA, 1 TE, 1 orthotic fitting and training   Total Timed Treatment: 45 minutes    Total Treatment Time: 45 minutes  PAUL Williamson, OTR/L, CHT

## 2025-01-06 NOTE — PROGRESS NOTES
Clinic Note     Assessment/Plan:  59 year old female    Left middle finger central slip repair/middle phalanx ORIF 12/10/2024-patient will continue therapy working on range of motion exercises per the protocol.  We did discuss starting a flexion splinting during the day and I discussed with her occupational therapist.  All of her questions were answered.    Follow Up: 3-4 weeks with x-rays before    Diagnostic Studies:     XR Left middle finger 3 views: Evidence of ORIF with retained plate no migration or failure noted.       Physical Exam:     Ht 5' 3.5\" (1.613 m)   Wt 182 lb (82.6 kg)   LMP 2017   BMI 31.73 kg/m²     Constitutional: NAD. AOx3. Well-developed and Well-nourished.   Psychiatric: Normal mood/ affect/ behavior. Judgment and thought content normal.     Left Upper Extremity:     Inspection  Incision healing well with no signs of infection Palpation    Tenderness to palpation at the surgical site and with passive motion      ROM  MP 0/60  PIP 10/40  DIP 0/40       Neurovascular    Normal sensation in the median, ulnar, and radial nerve distribution. Normal motor function of muscles innervated by median/AIN, ulnar, and radial/PIN nerves.    Normally perfused hand(s).     Special    None          CC: Left middle finger injury    HPI: This 59 year old RHD female presents with injury to the left middle finger.  Occurred Friday, 2024 after a fall.  Patient reports moderate aching sharp type pain.w patient was seen initially in the immediate care where she was splinted after x-rays confirmed fracture.    Occupation: Housewife    Interval history: Patient underwent right middle finger middle phalanx ORIF/central slip repair.    History/Other:   Past Medical History:    Anxiety state    Depression    High blood pressure    no meds     Past Surgical History:   Procedure Laterality Date           Current Outpatient Medications   Medication Sig Dispense Refill    traMADol 50 MG Oral  Tab Take 1 tablet (50 mg total) by mouth every 6 (six) hours as needed for Pain. (Patient not taking: Reported on 1/6/2025) 20 tablet 0    estradiol (LYLLANA) 0.1 MG/24HR Transdermal Patch Biweekly Place 1 patch onto the skin twice a week. (Patient not taking: Reported on 12/4/2024)      progesterone 100 MG Oral Cap Take 1 capsule (100 mg total) by mouth 2 (two) times daily. (Patient not taking: Reported on 12/4/2024)       Allergies[1]  Family History   Problem Relation Age of Onset    Pancreatic Cancer Father     Cancer Mother         nonhodgkins lymphoma    Melanoma Other         malignant     Social History     Occupational History    Not on file   Tobacco Use    Smoking status: Never    Smokeless tobacco: Never   Substance and Sexual Activity    Alcohol use: Not Currently    Drug use: No    Sexual activity: Yes     Partners: Male          Review of Systems (negative unless bolded):  General: fevers, chills, fatigue  CV:  chest pain, palpitations, leg swelling  Msk: bodyaches, neck pain, neck stiffness  Skin: rashes, open wounds, nonhealing ulcers  Hem: bleeds easily, bruise easily, immunocompromised  Neuro: dizziness, light headedness, headaches  Psych: anxious, depressed, anger issues  Laure Juárez PA-C  Hand, Wrist, & Elbow Surgery  Physician Assistant to Dr. Travis [email or web address]  t: [phone]  f: [phone]         [1] No Known Allergies

## 2025-01-07 ENCOUNTER — TELEPHONE (OUTPATIENT)
Dept: PHYSICAL THERAPY | Facility: HOSPITAL | Age: 60
End: 2025-01-07

## 2025-01-08 ENCOUNTER — OFFICE VISIT (OUTPATIENT)
Dept: OCCUPATIONAL MEDICINE | Age: 60
End: 2025-01-08
Attending: PHYSICIAN ASSISTANT
Payer: COMMERCIAL

## 2025-01-08 PROCEDURE — 97760 ORTHOTIC MGMT&TRAING 1ST ENC: CPT

## 2025-01-08 PROCEDURE — 97140 MANUAL THERAPY 1/> REGIONS: CPT

## 2025-01-08 PROCEDURE — 97110 THERAPEUTIC EXERCISES: CPT

## 2025-01-08 PROCEDURE — 97530 THERAPEUTIC ACTIVITIES: CPT

## 2025-01-08 NOTE — PROGRESS NOTES
Occupational Therapy Daily Note    Diagnosis:      Closed displaced fracture of middle phalanx of left middle finger;  central slip surgery and lateral band repair/involvement    Referring Provider: Franck  Date of Evaluation:    12/17/24    Precautions:   per protocol    anxiety Next Physician/PA visit: 2/3/25  DOI: 11/29/24  Date of Surgery: 12/10/24  Procedures:      Open reduction internal fixation of right middle finger intra-articular fracture of the middle phalanx (central slip avulsion fracture) (CPT 82581)   lateral band repair/involvement    Insurance Primary/Secondary: [place] / N/[place]     # Auth Visits: 8 through 12/31/24        Diagnosis:       Orders:   Order Date:  12/11/2024  Authorizing Provider:   DAR MACKENZIE     Procedure:  OP REFERRAL TO [facility] [[id number]]         Order #:   [id number]  Qty:  1     Priority:  Critical                   Class:   IHP - RFL     Standing Interval:          Standing Occurrences:          Expires on:            Expected by:    Associated DX:  Closed displaced fracture of middle phalanx of left middle finger, initial encounter (S62.623A)     Order summary:  IHP - RFL, Critical, 8 visits  Occupational  Therapy Area of Concentration: Orthopedic  Occupational Therapy Ortho: UE  If the patient can be seen sooner with a PT vs an OT, can we cancel this order and replace with a PT order? No         Comments:  Postsurgical: please schedule in 5-7 days  Patient had central slip surgery and lateral band repair/involvement   Zone 3 navneet womens protocol [facility] protocol   2-3x/week for 6 weeks         SUBJECTIVE:  Reports used P1 blocker 1x, but c/o that it blocks the PIPJ's and makes her joints hurt.  \"I think my finger is going to be like this forever.\"    Pain: 3-4/10    OBJECTIVE:        OUTCOME MEASURE   On Initial Evaluation:  QuickDASH Outcome Score  Score: (Patient-Rptd) 56.82 % (12/17/2024 12:26 PM)          Date:  12/17/24 12/26/24 1/8/25    Edema Girth measurements (cm)  Right/Left  left left left   Digit: MF  P1: 7.3  PIP: 7.7  P2: 6.8  DIP: 6.0  P3: 5.4 7.8  7.4  6.7  6.1  5.3 7.5  7.3  6.4  5.6  5.0   AROM to be formally measured: Able to achieve PIPJ flexion to ~20 degrees and DIP to ~10 degrees; full extension apparent, but with edema, PIPJ looks less.        Date: 1/6/25  LEFT  Digital AROM (°)  Index Finger  Middle Finger  Ring Finger  Small Finger    MP Ext/Flex  0/70 0/75 0/75 0/70   PIP Ext/Flex  0/80 10/40 0/75 0/75   DIP Ext/Flex  0/50 0/15 0/50 0/65   HINDS  200 120 200 210       Date: 1/13/25  LEFT  Digital AROM (°)  Index Finger  Middle Finger  Ring Finger  Small Finger    MP Ext/Flex  0/60 0/65 0/65 0/70   PIP Ext/Flex  0/60 20/45 0/65 0/75   DIP Ext/Flex  0/45 0/10 0/40 0/60   HINDS  165 100 170 205         ASSESSMENT  Patient did not bring in P1 blocker for therapist to assess, but was reminded that it was fabricated to allow for PIP motion.  She may not be applying it correctly, but she did not comply with it or bring it in for assessment.  She apparently discarded the FO static that was fabricated last week per protocol and has been wearing no splint since last seen, she is unsure when she last wore it.  Slight loss of extension in the MF PIPJ, likely due to noncompliance with orthoses.  Will offer trial of yoke orthosis for function.  Progress will continue to be limited due to noncompliance.      Date 1/6/25 1/8/25 1/13/25   Visit # 5 6 7   # of visits authorized: [facility] 8 -    # of visits in OT POC:  16 16 16   Evaluation      Progress Report written      Manual Therapy: Minutes: 10 10    MEM       retrograde massage x x    IASTM x x    STM x x     scar massage x x x   Ther ex: Minutes: 10 10 10    Fluidotherapy Fluidotherapy for 10 minutes to left hand, with AROM x digits performed.   Fluidotherapy for 10 minutes to left hand, with AROM x digits performed.   Fluidotherapy for 10 minutes to left hand, with AROM x digits performed.     Blocking  In P1 blocker                               HEP/  Patient education topics Importance of compliance with protocol, listening to therapist P1 blocker use, static holding in exercise splint at rest, 10-30 minutes while watching TV; issued pink sponge for in=hand manipulation; towel scrunches Importance of compliance with protocol reviewed; issued yoke orthosis for use during functional tasks   Therapeutic Activity: Minutes:  10    Finger wraps   Yarn skein   Towel scrunches  x    In-hand manipulation  Vibrating ball, pink sponge                            Self care training       Objective measurements taken and reviewed with patient (See above detail) x x x   Neuromuscular Re-education: Minutes:                              Orthotic fitting and training Fabricated per protocol FO with DIP and PIP in extension; reviewed full time use, removal more than 2xday for AROM and PROM.  Tried 3PP step down flexion splint; pt did not tolerate. Fabricated custom P1 blocker exerciser; modified finger exerciser for more flexion and added straps to progressively statically hold digit for flexion stretch Fabricated custom yoke orthosis to facilitate PIPJ flexion and  for function   Taping      Modalities      X= performed this date as previously outlined    HEP  On initial eval, patient education was provided on exam findings, treatment diagnosis, treatment plan, expectations, and prognosis. Patient was also provided recommendations for skin care, elevation    HEP/Education topic (See also above table) Date Issued Date modified Date discharged Comments    AROM in orthoses per protocol 12/17/24                                      Goals: (to be met in 16 visits)  Patient will be independent and compliant with comprehensive HEP to maintain progress achieved in OT. (Progressing)  Patient will present with increase in left digit 2-5 HINDS to 210 to allow for ease with gripping toothbrush. (Progressing)  Patient will  demonstrate a decrease in edema in the left hand/fingers by 0.4cm circumference to allow for ease with 0composite  around steering wheel. (Progressing)  Patient will report no more than 1/10 pain in left hand during self care activities. (Progressing)  Patient will present with  strength in the left hand to that of 50% of the contralateral hand in order to be able to perform housework. (Progressing)  Patient will present with sufficient ROM and strength in the left hand/arm to return to work, self care, homemaking and leisure skill independent performance. (Progressing)  Patient will demonstrate an improvement in the self rated DASH score to 25% or better to reflect functional gains reported, in the achievement of opening a jar, carrying a bag and in daily skills. (Progressing)  Additional goals may be established as clinically indicated.    PLAN:  Frequency / Duration: Patient will be seen for 1-2 x/week or a total of 16 visits over a 90 day period.  Treatment will include: Manual Therapy, Neuromuscular Re-education, Self-Care Home Management, Therapeutic Activities, Therapeutic Exercise, Home Exercise Program instruction, Modalities to include: Ultrasound and hot packs , FT, and paraffin,  taping, and custom splinting.    Next session: Fluidotherapy, scar massage, monitor yoke orthosis use    Charges:   1 TE, 1 orthotic fitting and training   Total Timed Treatment: 35 minutes Patient arrived 11 minutes late for session   Total Treatment Time: 35  minutes  PAUL Williamson, OTR/L, CHT

## 2025-01-13 ENCOUNTER — OFFICE VISIT (OUTPATIENT)
Dept: OCCUPATIONAL MEDICINE | Age: 60
End: 2025-01-13
Attending: PHYSICIAN ASSISTANT
Payer: COMMERCIAL

## 2025-01-13 PROCEDURE — 97760 ORTHOTIC MGMT&TRAING 1ST ENC: CPT

## 2025-01-13 PROCEDURE — 97110 THERAPEUTIC EXERCISES: CPT

## 2025-01-13 NOTE — PROGRESS NOTES
Occupational Therapy Daily Note    Diagnosis:      Closed displaced fracture of middle phalanx of left middle finger;  central slip surgery and lateral band repair/involvement    Referring Provider: Franck  Date of Evaluation:    12/17/24    Precautions:   per protocol    anxiety Next Physician/PA visit: 2/3/25  DOI: 11/29/24  Date of Surgery: 12/10/24  Procedures:      Open reduction internal fixation of right middle finger intra-articular fracture of the middle phalanx (central slip avulsion fracture) (CPT 72801)   lateral band repair/involvement    Insurance Primary/Secondary: [place] / N/A     # Auth Visits: 8 through 12/31/24        Diagnosis:       Orders:   Order Date:  12/11/2024  Authorizing Provider:   DAR MACKENZIE     Procedure:  OP REFERRAL TO [facility] [[id number]]         Order #:   [id number]  Qty:  1     Priority:  Critical                   Class:   IHP - RFL     Standing Interval:          Standing Occurrences:          Expires on:            Expected by:    Associated DX:  Closed displaced fracture of middle phalanx of left middle finger, initial encounter (S62.623A)     Order summary:  IHP - RFL, Critical, 8 visits  Occupational  Therapy Area of Concentration: Orthopedic  Occupational Therapy Ortho: UE  If the patient can be seen sooner with a PT vs an OT, can we cancel this order and replace with a PT order? No         Comments:  Postsurgical: please schedule in 5-7 days  Patient had central slip surgery and lateral band repair/involvement   Zone 3 navneet womens protocol [facility] protocol   2-3x/week for 6 weeks         SUBJECTIVE:  Reports leaving yoke orthosis at home; is not clear on how much or when she used it.  \"I think there is something wrong with the screw,\" [points to both dorsal and volar surface]    Pain: 3-4/10    OBJECTIVE:        OUTCOME MEASURE   On Initial Evaluation:  QuickDASH Outcome Score  Score: (Patient-Rptd) 56.82 % (12/17/2024 12:26  PM)          Date:  12/17/24 12/26/24 1/8/25 1/15/25   Edema Girth measurements (cm)  Right/Left  left left left left   Digit: MF  P1: 7.3  PIP: 7.7  P2: 6.8  DIP: 6.0  P3: 5.4 7.8  7.4  6.7  6.1  5.3 7.5  7.3  6.4  5.6  5.0 7.6  7.5  6.5  5.7  5.1   AROM to be formally measured: Able to achieve PIPJ flexion to ~20 degrees and DIP to ~10 degrees; full extension apparent, but with edema, PIPJ looks less.        Date: 1/6/25  LEFT  Digital AROM (°)  Index Finger  Middle Finger  Ring Finger  Small Finger    MP Ext/Flex  0/70 0/75 0/75 0/70   PIP Ext/Flex  0/80 10/40 0/75 0/75   DIP Ext/Flex  0/50 0/15 0/50 0/65   HINDS  200 120 200 210       Date: 1/13/25  LEFT  Digital AROM (°)  Index Finger  Middle Finger  Ring Finger  Small Finger    MP Ext/Flex  0/60 0/65 0/65 0/70   PIP Ext/Flex  0/60 20/45 0/65 0/75   DIP Ext/Flex  0/45 0/10 0/40 0/60   HINDS  165 100 170 205         Date: 1/15/25  LEFT  Digital AROM (°)  Index Finger  Middle Finger  Ring Finger  Small Finger    MP Ext/Flex  0/70 0/75 0/70 0/80   PIP Ext/Flex  0/70 10/40 0/70 0/70   DIP Ext/Flex  0/40 0/5 0/35 0/60   HINDS  180 110 175 210       ASSESSMENT  Patient presents with persistent edema in the LMF but slight increase in ROM in the hand.  However, to achieve AROM in clinic for measurements, therapist must be firm in not allowing her to say \"I can't do it\" and encouraging motion in all digits.  Non compliance with yoke/force redirection orthosis will result in inability to flex the IPJ's of the MF, and I have clearly and repeatedly informed her of this.  She is hyperfocused on the \"screw\", and I have redirected her to continue to make every attempt to comply with therapy and move digits to get maximal results, and f/u with surgeon as she feels necessary.   Also of note, she has not been compliant with static flexed orthosis for self stretches at rest.  At the end of the session, she found she had the yoke orthosis in the purse; fit checked and noted good fit;  encouraged compliance with use of it during the day/functional tasks.      Date 1/6/25 1/8/25 1/13/25 1/15/25   Visit # [phone]   # of visits authorized: HMO 8 -     # of visits in OT POC:  16 16 16 16   Evaluation       Progress Report written       Manual Therapy: Minutes: 10 10  10   MEM    x    retrograde massage x x  x   IASTM x x  x   STM x x  x    scar massage x x x x   Ther ex: Minutes: 10 10 10 10    Fluidotherapy Fluidotherapy for 10 minutes to left hand, with AROM x digits performed.   Fluidotherapy for 10 minutes to left hand, with AROM x digits performed.   Fluidotherapy for 10 minutes to left hand, with AROM x digits performed. Fluidotherapy for 10 minutes to left hand, with AROM x digits performed.    Blocking  In P1 blocker  x                                  HEP/  Patient education topics Importance of compliance with protocol, listening to therapist P1 blocker use, static holding in exercise splint at rest, 10-30 minutes while watching TV; issued pink sponge for in=hand manipulation; towel scrunches Importance of compliance with protocol reviewed; issued yoke orthosis for use during functional tasks Repeated importance of compliance with recommended tools, including the yoke orthosis, which last session she indicated felt good.   Therapeutic Activity: Minutes:  10     Finger wraps   Yarn skein Graded pen grasps   Towel scrunches  x     In-hand manipulation  Vibrating ball, pink sponge  Rice marble sifting, yellow flexbar rotation                               Self care training        Objective measurements taken and reviewed with patient (See above detail) x x x x   Neuromuscular Re-education: Minutes:                                   Orthotic fitting and training Fabricated per protocol FO with DIP and PIP in extension; reviewed full time use, removal more than 2xday for AROM and PROM.  Tried 3PP step down flexion splint; pt did not tolerate. Fabricated custom P1 blocker exerciser; modified  finger exerciser for more flexion and added straps to progressively statically hold digit for flexion stretch Fabricated custom yoke orthosis to facilitate PIPJ flexion and  for function    Taping       Modalities       X= performed this date as previously outlined    HEP  On initial eval, patient education was provided on exam findings, treatment diagnosis, treatment plan, expectations, and prognosis. Patient was also provided recommendations for skin care, elevation    HEP/Education topic (See also above table) Date Issued Date modified Date discharged Comments    AROM in orthoses per protocol 12/17/24                                      Goals: (to be met in 16 visits)  Patient will be independent and compliant with comprehensive HEP to maintain progress achieved in OT. (Progressing)  Patient will present with increase in left digit 2-5 HINDS to 210 to allow for ease with gripping toothbrush. (Progressing)  Patient will demonstrate a decrease in edema in the left hand/fingers by 0.4cm circumference to allow for ease with 0composite  around steering wheel. (Progressing)  Patient will report no more than 1/10 pain in left hand during self care activities. (Progressing)  Patient will present with  strength in the left hand to that of 50% of the contralateral hand in order to be able to perform housework. (Progressing)  Patient will present with sufficient ROM and strength in the left hand/arm to return to work, self care, homemaking and leisure skill independent performance. (Progressing)  Patient will demonstrate an improvement in the self rated DASH score to 25% or better to reflect functional gains reported, in the achievement of opening a jar, carrying a bag and in daily skills. (Progressing)  Additional goals may be established as clinically indicated.    PLAN:  Frequency / Duration: Patient will be seen for 1-2 x/week or a total of 16 visits over a 90 day period.  Treatment will include: Manual  Therapy, Neuromuscular Re-education, Self-Care Home Management, Therapeutic Activities, Therapeutic Exercise, Home Exercise Program instruction, Modalities to include: Ultrasound and hot packs , FT, and paraffin,  taping, and custom splinting.    Next session: Fluidotherapy, scar massage, monitor yoke orthosis use    Charges:   1 TE, 1 MT, 1 TA   Total Timed Treatment: 45 minutes    Total Treatment Time: 45 minutes  PAUL Williamson, OTR/L, CHT

## 2025-01-15 ENCOUNTER — OFFICE VISIT (OUTPATIENT)
Dept: OCCUPATIONAL MEDICINE | Age: 60
End: 2025-01-15
Attending: PHYSICIAN ASSISTANT
Payer: COMMERCIAL

## 2025-01-15 PROCEDURE — 97110 THERAPEUTIC EXERCISES: CPT

## 2025-01-15 PROCEDURE — 97140 MANUAL THERAPY 1/> REGIONS: CPT

## 2025-01-15 PROCEDURE — 97530 THERAPEUTIC ACTIVITIES: CPT

## 2025-01-22 ENCOUNTER — TELEPHONE (OUTPATIENT)
Dept: OCCUPATIONAL MEDICINE | Age: 60
End: 2025-01-22

## 2025-01-24 ENCOUNTER — TELEPHONE (OUTPATIENT)
Dept: OCCUPATIONAL MEDICINE | Age: 60
End: 2025-01-24

## 2025-01-27 ENCOUNTER — APPOINTMENT (OUTPATIENT)
Dept: OCCUPATIONAL MEDICINE | Age: 60
End: 2025-01-27
Attending: PHYSICIAN ASSISTANT
Payer: COMMERCIAL

## 2025-01-29 ENCOUNTER — APPOINTMENT (OUTPATIENT)
Dept: OCCUPATIONAL MEDICINE | Age: 60
End: 2025-01-29
Attending: PHYSICIAN ASSISTANT
Payer: COMMERCIAL

## 2025-01-31 ENCOUNTER — TELEPHONE (OUTPATIENT)
Dept: ORTHOPEDICS CLINIC | Facility: CLINIC | Age: 60
End: 2025-01-31

## 2025-01-31 DIAGNOSIS — S62.623A CLOSED DISPLACED FRACTURE OF MIDDLE PHALANX OF LEFT MIDDLE FINGER, INITIAL ENCOUNTER: Primary | ICD-10-CM

## 2025-02-03 ENCOUNTER — OFFICE VISIT (OUTPATIENT)
Dept: ORTHOPEDICS CLINIC | Facility: CLINIC | Age: 60
End: 2025-02-03
Payer: COMMERCIAL

## 2025-02-03 ENCOUNTER — HOSPITAL ENCOUNTER (OUTPATIENT)
Dept: GENERAL RADIOLOGY | Age: 60
Discharge: HOME | End: 2025-02-03
Attending: ORTHOPAEDIC SURGERY
Payer: COMMERCIAL

## 2025-02-03 VITALS — HEIGHT: 63.5 IN | BODY MASS INDEX: 31.85 KG/M2 | WEIGHT: 182 LBS

## 2025-02-03 DIAGNOSIS — S62.623A CLOSED DISPLACED FRACTURE OF MIDDLE PHALANX OF LEFT MIDDLE FINGER, INITIAL ENCOUNTER: Primary | ICD-10-CM

## 2025-02-03 DIAGNOSIS — S62.623A CLOSED DISPLACED FRACTURE OF MIDDLE PHALANX OF LEFT MIDDLE FINGER, INITIAL ENCOUNTER: ICD-10-CM

## 2025-02-03 PROCEDURE — 73140 X-RAY EXAM OF FINGER(S): CPT | Performed by: ORTHOPAEDIC SURGERY

## 2025-02-03 PROCEDURE — 3008F BODY MASS INDEX DOCD: CPT | Performed by: ORTHOPAEDIC SURGERY

## 2025-02-03 PROCEDURE — 99024 POSTOP FOLLOW-UP VISIT: CPT | Performed by: ORTHOPAEDIC SURGERY

## 2025-02-03 NOTE — PROGRESS NOTES
Clinic Note     Assessment/Plan:  59 year old female    Left middle finger central slip repair/middle phalanx ORIF 12/10/2024-patient like to transition to therapy closer to home.  Would recommend continue to do therapy and home exercise program.  I did reassure her that it is too early this determine whether the plate and screw is bothering her.  I would wait until the 6-month milagro.  She is close enough to surgery that it is normal to have sensitivity in that area.  Fracture appears radiographically healed and thus no restrictions at this point from a weightbearing standpoint.  She was encouraged to become more aggressive with passive range of motion exercises.  Stiffness is likely coming from PIP joint is reasonable central slip tendon excursion is noted on examination.  I also reassured her that it would be very unlikely for her to be sensitive to the metal however it is not impossible.  There is some hyperpigmentation along the dorsal aspect of the PIP joint at the incision site which is more so related to the incision than the actual implant.  Reassured that this will likely improve with time.    Follow Up: 6 weeks    Diagnostic Studies:     XR Left middle finger 3 views: Evidence of ORIF with retained plate no migration or failure noted.       Physical Exam:     Ht 5' 3.5\" (1.613 m)   Wt 182 lb (82.6 kg)   LMP 05/07/2017   BMI 31.73 kg/m²     Constitutional: NAD. AOx3. Well-developed and Well-nourished.   Psychiatric: Normal mood/ affect/ behavior. Judgment and thought content normal.     Left Upper Extremity:     Inspection  Incision healing well with no signs of infection Palpation    Mild tenderness over the PIP joint      ROM  MP 0/80 passively  PIP 10/60 passively  DIP 0/40       Neurovascular    Normal sensation in the median, ulnar, and radial nerve distribution. Normal motor function of muscles innervated by median/AIN, ulnar, and radial/PIN nerves.    Normally perfused hand(s).      Special    None          CC: Left middle finger injury    HPI: This 59 year old RHD female presents with injury to the left middle finger.  Occurred Friday, 2024 after a fall.  Patient reports moderate aching sharp type pain.w patient was seen initially in the immediate care where she was splinted after x-rays confirmed fracture.    Interval Hx (2/3/2025): Patient reports ongoing stiffness and swelling.  She also notes difficulty with flexion with adjacent fingers.  She has pain over the PIP joint possibly at the site of the plate and screws.      Occupation: Housewife    History/Other:   Past Medical History:    Anxiety state    Depression    High blood pressure    no meds     Past Surgical History:   Procedure Laterality Date           Current Outpatient Medications   Medication Sig Dispense Refill    traMADol 50 MG Oral Tab Take 1 tablet (50 mg total) by mouth every 6 (six) hours as needed for Pain. (Patient not taking: Reported on 2024) 20 tablet 0    estradiol (LYLLANA) 0.1 MG/24HR Transdermal Patch Biweekly Place 1 patch onto the skin twice a week. (Patient not taking: Reported on 2024)      progesterone 100 MG Oral Cap Take 1 capsule (100 mg total) by mouth 2 (two) times daily. (Patient not taking: Reported on 2024)       Allergies[1]  Family History   Problem Relation Age of Onset    Pancreatic Cancer Father     Cancer Mother         nonhodgkins lymphoma    Melanoma Other         malignant     Social History     Occupational History    Not on file   Tobacco Use    Smoking status: Never    Smokeless tobacco: Never   Substance and Sexual Activity    Alcohol use: Not Currently    Drug use: No    Sexual activity: Yes     Partners: Male          Review of Systems (negative unless bolded):  General: fevers, chills, fatigue  CV:  chest pain, palpitations, leg swelling  Msk: bodyaches, neck pain, neck stiffness  Skin: rashes, open wounds, nonhealing ulcers  Hem: bleeds easily,  bruise easily, immunocompromised  Neuro: dizziness, light headedness, headaches  Psych: anxious, depressed, anger issues  Laure Juárez PA-C  Hand, Wrist, & Elbow Surgery  Physician Assistant to Dr. Travis [email or web address]  t: [phone]  f: [phone]         [1] No Known Allergies

## 2025-02-12 NOTE — PROGRESS NOTES
Discharge Summary  Pt has attended 8 visits in Occupational Therapy.       Diagnosis:      Closed displaced fracture of middle phalanx of left middle finger;  central slip surgery and lateral band repair/involvement    Referring Provider: Carlyle Date of Evaluation:    12/17/24    Precautions:   per protocol    anxiety Next Physician/PA visit: 2/3/25  DOI: 11/29/24  Date of Surgery: 12/10/24  Procedures:      Open reduction internal fixation of right middle finger intra-articular fracture of the middle phalanx (central slip avulsion fracture) (CPT 88616)   lateral band repair/involvement    Insurance Primary/Secondary: [place] / N/[place]         Diagnosis:       Orders:   Order Date:  12/11/2024  Authorizing Provider:   DAR MACKENZIE     Procedure:  OP REFERRAL TO [facility] [[id number]]         Order #:   [id number]  Qty:  1     Priority:  Critical                   Class:   IHP - RFL     Standing Interval:          Standing Occurrences:          Expires on:            Expected by:    Associated DX:  Closed displaced fracture of middle phalanx of left middle finger, initial encounter (S62.628P)     Order summary:  IHP - RFL, Critical, 8 visits  Occupational  Therapy Area of Concentration: Orthopedic  Occupational Therapy Ortho: UE  If the patient can be seen sooner with a PT vs an OT, can we cancel this order and replace with a PT order? No         Comments:  Postsurgical: please schedule in 5-7 days  Patient had central slip surgery and lateral band repair/involvement   Zone 3 navneet womens protocol [facility] protocol   2-3x/week for 6 weeks         SUBJECTIVE:  Pt last seen 1/15/25.  See subsequent telephone encounter.    OBJECTIVE:        OUTCOME MEASURE   On Initial Evaluation:  QuickDASH Outcome Score  Score: (Patient-Rptd) 56.82 % (12/17/2024 12:26 PM)          Date:  12/17/24 12/26/24 1/8/25 1/15/25   Edema Girth measurements (cm)  Right/Left  left left left left   Digit: MF  P1: 7.3  PIP:  7.7  P2: 6.8  DIP: 6.0  P3: 5.4 7.8  7.4  6.7  6.1  5.3 7.5  7.3  6.4  5.6  5.0 7.6  7.5  6.5  5.7  5.1   AROM to be formally measured: Able to achieve PIPJ flexion to ~20 degrees and DIP to ~10 degrees; full extension apparent, but with edema, PIPJ looks less.        Date: 1/6/25  LEFT  Digital AROM (°)  Index Finger  Middle Finger  Ring Finger  Small Finger    MP Ext/Flex  0/70 0/75 0/75 0/70   PIP Ext/Flex  0/80 10/40 0/75 0/75   DIP Ext/Flex  0/50 0/15 0/50 0/65   HINDS  200 120 200 210       Date: 1/13/25  LEFT  Digital AROM (°)  Index Finger  Middle Finger  Ring Finger  Small Finger    MP Ext/Flex  0/60 0/65 0/65 0/70   PIP Ext/Flex  0/60 20/45 0/65 0/75   DIP Ext/Flex  0/45 0/10 0/40 0/60   HINDS  165 100 170 205         Date: 1/15/25  LEFT  Digital AROM (°)  Index Finger  Middle Finger  Ring Finger  Small Finger    MP Ext/Flex  0/70 0/75 0/70 0/80   PIP Ext/Flex  0/70 10/40 0/70 0/70   DIP Ext/Flex  0/40 0/5 0/35 0/60   HINDS  180 110 175 210       ASSESSMENT  Patient is a 58 yo female, who has been seen in Occupational Therapy since initial eval on 12/17/24, with last treatment session on 1/15/25.  The patient has attended 8/10 scheduled appointments, with 2 no shows.  Focus of skilled OT has been on ROM, and edema, with use of interventions including therapeutic activities, therapeutic exercises, modalities such as hot packs, FT, scar management, manual therapy, adapted ADL training,  neuromuscular re-ed, and custom splinting to achieve the functional goals listed below.   Limitations and barriers toward progress include: non-compliance and attendance.  At this time, MD notes indicate she is going to go to therapy closer to home and will, therefore, be d/c'ed from these OT services.  Goals overall not achieved.        HEP  On initial eval, patient education was provided on exam findings, treatment diagnosis, treatment plan, expectations, and prognosis. Patient was also provided recommendations for skin care,  elevation    HEP/Education topic (See also above table) Date Issued Date modified Date discharged Comments    AROM in orthoses per protocol 12/17/24                                      Goals: (to be met in 16 visits) Not met due to noncompliance and self discharge.  Patient will be independent and compliant with comprehensive HEP to maintain progress achieved in O  Patient will demonstrate a decrease in edema in the left hand/fingers by 0.4cm circumference to allow for ease with 0composite  around steering wheel.   Patient will report no more than 1/10 pain in left hand during self care activities.   Patient will present with  strength in the left hand to that of 50% of the contralateral hand in order to be able to perform housework.   Patient will present with sufficient ROM and strength in the left hand/arm to return to work, self care, homemaking and leisure skill independent performance.   Patient will demonstrate an improvement in the self rated DASH score to 25% or better to reflect functional gains reported, in the achievement of opening a jar, carrying a bag and in daily skills.       PLAN:  Self discharge    PAUL Williamson, OTR/L, CHT

## 2025-07-02 NOTE — PROGRESS NOTES
Tommy Onofre is a 59 year old female.     HPI:   Patient presents for the following issues. She was 10 minutes late to appt.   Mood - she has been feeling very stressed. Some days are worse than others. Denies SI/HI. \"I am not depressed.\" A large source of stress for her is her ex-'s life. She is not participating in counseling. She practices \"somatic exercises\" and tries to do yoga every day for 20 minutes. She believes HRT is going to make her feel better.  Weight management - she is gaining weight but she has started to eat more home-cooked meals and less meat.   Previously on HRT - she has not been on therapy for 3 years and she is not sure why she stopped taking it. Denies personal or family history of breast, uterine, or ovarian cancer. Denies history of thrombosis.   Elevated blood pressure - she started checking her pressures consistently 6 weeks ago. Her log shows her average bp are 123/91. She is checking her pressures every day at the same time. She is not following proper technique. She states \"I really want to be on HRT, how can you expect my blood pressure to be normal at my age?\"    REVIEW OF SYSTEMS:   GENERAL HEALTH: feels well otherwise. No f/c  NEURO: denies any headaches, LH, dizzyness, LOC, falls  VISION: denies any blurred or double vision  RESPIRATORY: denies shortness of breath, cough, or congestion  CARDIOVASCULAR: denies chest pain, pressure or palpitations  GI: denies abdominal pain, constipation, diarrhea, n/v, BRBPR, melena  : no dysuria or hematuria or vaginal bleeding  PSYCH: see HPI  EXT: denies edema     Wt Readings from Last 6 Encounters:   02/03/25 182 lb (82.6 kg)   01/06/25 182 lb (82.6 kg)   12/18/24 182 lb (82.6 kg)   12/04/24 182 lb (82.6 kg)   12/04/24 182 lb (82.6 kg)   11/29/24 182 lb (82.6 kg)       Allergies[1]    Family History   Problem Relation Age of Onset    Pancreatic Cancer Father     Cancer Mother         nonhodgkins lymphoma    Melanoma Other          malignant      Past Medical History:    Anxiety state    Depression    High blood pressure    no meds      Past Surgical History:   Procedure Laterality Date            Social History:    Social History     Socioeconomic History    Marital status:    Tobacco Use    Smoking status: Never    Smokeless tobacco: Never   Substance and Sexual Activity    Alcohol use: Not Currently    Drug use: No    Sexual activity: Yes     Partners: Male   Other Topics Concern    Caffeine Concern Yes     Comment: coffee, tea, 2 cups daily           EXAM:   /84 (BP Location: Right arm)   Pulse 95   Temp 98 °F (36.7 °C) (Temporal)   Ht 5' 3.5\" (1.613 m)   Wt 187 lb 12.8 oz (85.2 kg)   LMP 2017   SpO2 98%   BMI 32.75 kg/m²   GENERAL: A&O well developed, well nourished,in no apparent distress  SKIN: no rashes,no suspicious lesions  HEENT: atraumatic, MMM, throat is clear  NECK: supple, no jvd, no thyromegaly  LUNGS: clear to auscultation bilateraly, no c/w/r  CARDIO: RRR without g/m/r  GI: softly distended, obese, non-tender, no HSM, normal BS throughout  NEURO: CN 2-12 grossly intact  PSYCH: pleasant  EXTREMITIES: no cyanosis, clubbing or edema      ASSESSMENT AND PLAN:   # HRT - she wants to resume this for mood. She is not experiencing hot flashes or arthralgias or brain fog. Her blood pressures are acceptable but she will need to follow her pressures closely after starting HRT.   # Elevated ambulatory bp - likely due to poor technique. Discussed proper technique and she will return with her home machine.   # Obese, BMI 32: continue to reinforce lifestyle interventions.   # MDD and anxiety: I continue to reinforce counseling and lifestyle modifications. She feels strongly that resuming HRT is going to help.   - Wellbutrin helped a bit but caused \"terrible weight loss.\", prozac did not help.   # Health Maintenance: wellness exam in 2024  Colon Cancer Screening: no Fhx. Discussed and she declines  scope. FIT neg in 11/2024  Cervical Cancer Screening: neg cytology and HPV in 9/2024. Repeat in 5 years (2029)  Breast Cancer Screening: has dense breasts, cont yearly mammogram. Performed shared clinical decision-making and she declines supplemental imaging.   Bone Health/Fall Prevention (Marshal Chi): educated on dietary calcium/vit D3, weight bearing exercises and fall prevention  Vaccines: counseled on prevnar,  flu, covid, shingrix, and tdap.   Hep C screening: Ab neg in 2024  Medical POA: her ex- would be her primary contact. He is an internal medicine physician      The patient indicates understanding of these issues and agrees to the plan.  The patient is asked to return in 11/2025 for physical exam, f/u on HRT, and blood pressure.   Nory Preciado MD             [1] No Known Allergies

## 2025-07-02 NOTE — PATIENT INSTRUCTIONS
Please bring your machine into your next office visit to ensure it is accurate.   Please measure your blood pressure and pulse daily and record these values. Please measure your blood pressure once daily after you have been resting for at least 5 minutes. Both feet should be flat on the ground and you should be seated with your back supported. Please communicate your blood pressures to me in 14 days.       You are due for your mammogram after 9/5/2025.    I recommend the following vaccines -  TDAP (tetanus, diptheriae, pertussis) vaccine every 10 years.     Shingrix vaccine once in a lifetime. It is a two step vaccine given 2-6 months apart.     Prevnar 20 once after age 50    Annual FLU every September, October.    Stay up to date with COVID vaccines.

## (undated) NOTE — ED AVS SNAPSHOT
[facility] Emergency Department    [address]     [place] [address]    Phone:  [phone]    Fax:  [phone]           Tedi Sandifer   MRN: [id number]    Department:  [facility] Emergency Department   Date of Visit:  5/27 If you have difficulty scheduling your follow-up appointment as directed, please call our  at [phone]. Si tiene problemas para programar zhanna fitz de seguimiento según lo indicado, llame al encargado de val al [phone].     It i continue to take your medications as instructed by your Primary Care doctor until you can check with your doctor. Please bring the medication list to your next doctor's appointment.     Any imaging studies and labs completed today can be reviewed in your [place] plans. To get signed up and covered, please call [phone] and ask to get set up for an insurance coverage that is in-network with Jamesmoneva Eaton. Nikolai     Sign up for [employer], your secure online medical record.   [employer]

## (undated) NOTE — ED AVS SNAPSHOT
[facility] Emergency Department    [address]     [place] [address]    Phone:  [phone]    Fax:  [phone]           Anahi Gudino   MRN: [id number]    Department:  [facility] Emergency Department   Date of Visit:  5/27 and Class Registration line at [phone] or find a doctor online by visiting [email or web address].    IF THERE IS ANY CHANGE OR WORSENING OF YOUR CONDITION, CALL YOUR PRIMARY CARE PHYSICIAN AT ONCE OR RETURN IMMEDIATELY TO [address] [place] [address].     If